# Patient Record
Sex: FEMALE | Race: WHITE | NOT HISPANIC OR LATINO | Employment: FULL TIME | URBAN - NONMETROPOLITAN AREA
[De-identification: names, ages, dates, MRNs, and addresses within clinical notes are randomized per-mention and may not be internally consistent; named-entity substitution may affect disease eponyms.]

---
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
## 2018-01-02 ENCOUNTER — APPOINTMENT (OUTPATIENT)
Dept: PREADMISSION TESTING | Facility: HOSPITAL | Age: 18
End: 2018-01-02

## 2018-01-02 VITALS
HEART RATE: 74 BPM | SYSTOLIC BLOOD PRESSURE: 115 MMHG | OXYGEN SATURATION: 100 % | DIASTOLIC BLOOD PRESSURE: 71 MMHG | BODY MASS INDEX: 19.7 KG/M2 | RESPIRATION RATE: 18 BRPM | HEIGHT: 67 IN | WEIGHT: 125.5 LBS

## 2018-01-02 LAB
DEPRECATED RDW RBC AUTO: 38.6 FL (ref 40–54)
ERYTHROCYTE [DISTWIDTH] IN BLOOD BY AUTOMATED COUNT: 12.8 % (ref 12–15)
HCT VFR BLD AUTO: 38 % (ref 37–47)
HGB BLD-MCNC: 12.5 G/DL (ref 12–16)
MCH RBC QN AUTO: 27.4 PG (ref 28–32)
MCHC RBC AUTO-ENTMCNC: 32.9 G/DL (ref 33–36)
MCV RBC AUTO: 83.3 FL (ref 82–98)
PLATELET # BLD AUTO: 250 10*3/MM3 (ref 130–400)
PMV BLD AUTO: 9.4 FL (ref 6–12)
RBC # BLD AUTO: 4.56 10*6/MM3 (ref 4.2–5.4)
WBC NRBC COR # BLD: 7.59 10*3/MM3 (ref 4.8–10.8)

## 2018-01-02 PROCEDURE — 85027 COMPLETE CBC AUTOMATED: CPT | Performed by: OTOLARYNGOLOGY

## 2018-01-02 PROCEDURE — 36415 COLL VENOUS BLD VENIPUNCTURE: CPT

## 2018-01-04 ENCOUNTER — ANESTHESIA EVENT (OUTPATIENT)
Dept: PERIOP | Facility: HOSPITAL | Age: 18
End: 2018-01-04

## 2018-01-04 NOTE — H&P (VIEW-ONLY)
YOB: 2000  Location: [facility]  Location Address: [address] [place] [address], [facility] [address], [address] [place] [address]  Location Phone: [phone]    Chief Complaint   Patient presents with   • Follow-up     ear problems       History of Present Illness  Lynette Faulkner is a 17 y.o. female.  Lynette Faulkner is here for follow up of ENT complaints. The patient has had problems with otalgia, ear fullness, ear pressure, recurrent ear infections, fluid on the ear and imbalance  The symptoms are not localized to a particular location. The patient has had moderate to severe symptoms. The symptoms have been present for the last year The symptoms are aggravated by  no identifiable factors. The symptoms are improved by medications temporarily. The patient has had several ear infections in the last year. The mother is unsure of the exact number but feels like it is 6 plus.       Past Medical History:   Diagnosis Date   • ADD (attention deficit disorder)    • Impacted cerumen    • Otalgia    • TMJ (dislocation of temporomandibular joint)        Past Surgical History:   Procedure Laterality Date   • CYST REMOVAL      left shoulder   • EXCISION LESION      left foot benign   • TONSILLECTOMY           Current Outpatient Prescriptions:   •  amphetamine-dextroamphetamine XR (ADDERALL XR) 20 MG 24 hr capsule, , Disp: , Rfl:   •  ciprofloxacin-dexamethasone (CIPRODEX) 0.3-0.1 % otic suspension, Administer 3 drops into both ears 3 (Three) Times a Day As Needed (otalgia)., Disp: 7.5 mL, Rfl: 0    Amoxicillin    Family History   Problem Relation Age of Onset   • No Known Problems Mother    • No Known Problems Father        Social History     Social History   • Marital status: Single     Spouse name: N/A   • Number of children: N/A   • Years of education: N/A     Occupational History   • Not on file.     Social History Main Topics   • Smoking status: Never Smoker   • Smokeless tobacco: Never Used   • Alcohol use No   • Drug use: Defer   •  Sexual activity: Not on file     Other Topics Concern   • Not on file     Social History Narrative       Review of Systems   Constitutional: Negative for activity change, appetite change, chills, diaphoresis, fatigue, fever and unexpected weight change.   HENT: Positive for ear pain. Negative for congestion, dental problem, drooling, ear discharge, facial swelling, hearing loss, mouth sores, nosebleeds, postnasal drip, rhinorrhea, sinus pressure, sneezing, sore throat, tinnitus, trouble swallowing and voice change.    Eyes: Negative.    Respiratory: Negative.    Cardiovascular: Negative.    Gastrointestinal: Negative.    Endocrine: Negative.    Skin: Negative.    Allergic/Immunologic: Negative for environmental allergies, food allergies and immunocompromised state.   Neurological: Negative.         Imbalance   Hematological: Negative.    Psychiatric/Behavioral: Negative.        Vitals:    12/11/17 1407   BP: 129/73   Pulse: 93   Temp: 97.9 °F (36.6 °C)       Objective     Physical Exam  CONSTITUTIONAL: well nourished, alert, oriented, in no acute distress     COMMUNICATION AND VOICE: able to communicate normally, normal voice quality    HEAD: normocephalic, no lesions, atraumatic, no tenderness, no masses     FACE: appearance normal, no lesions, no tenderness, no deformities, facial motion symmetric    SALIVARY GLANDS: parotid glands with no tenderness, no swelling, no masses, submandibular glands with normal size, nontender    EYES: ocular motility normal, eyelids normal, orbits normal, no proptosis, conjunctiva normal , pupils equal, round     EARS:  Hearing: response to conversational voice normal bilaterally   External Ears: auricles without lesions  Otoscopic: tympanic membrane appearance normal with mild retraction L>R, no lesions, no perforation, normal mobility, no fluid, type C tympanogram bilaterally    NOSE:  External Nose: structure normal, no tenderness on palpation, no nasal discharge, no lesions, no  evidence of trauma, nostrils patent   Intranasal Exam: nasal mucosa normal, vestibule within normal limits, inferior turbinate normal, nasal septum midline   Nasopharynx:     ORAL:  Lips: upper and lower lips without lesion   Teeth: dentition within normal limits for age   Gums: gingivae healthy   Oral Mucosa: oral mucosa normal, no mucosal lesions   Floor of Mouth: Warthin’s duct patent, mucosa normal  Tongue: lingual mucosa normal without lesions, normal tongue mobility   Palate: soft and hard palates with normal mucosa and structure  Oropharynx: oropharyngeal mucosa normal    NECK: neck appearance normal, no mass,  noted without erythema or tenderness    THYROID: no overt thyromegaly, no tenderness, nodules or mass present on palpation, position midline     LYMPH NODES: no lymphadenopathy    CHEST/RESPIRATORY: respiratory effort normal, normal breath sounds     CARDIOVASCULAR: rate and rhythm normal, extremities without cyanosis or edema      NEUROLOGIC/PSYCHIATRIC: oriented to time, place and person, mood normal, affect appropriate, CN II-XII intact grossly    Assessment/Plan   Problems Addressed this Visit        Nervous and Auditory    Dysfunction of both eustachian tubes - Primary    Relevant Orders    Case Request (Completed)    Recurrent acute suppurative otitis media without spontaneous rupture of tympanic membrane of both sides    Relevant Orders    Case Request (Completed)        BILATERAL MYRINGOTOMY WITH INSERTION OF EAR TUBES (Bilateral)  Orders Placed This Encounter   Procedures   • Follow Anesthesia Guidelines / Standing Orders     Standing Status:   Future   • Obtain informed consent     Order Specific Question:   Informed Consent Given For     Answer:   BILATERAL MYRINGOTOMY WITH INSERTION OF EAR TUBES   • Provide instructions to patient on NPO status     Return in about 6 months (around 6/11/2018) for Recheck ears/ear cleaning.       Patient Instructions   MYRINGOTOMY TUBE INSERTION: The risks  and benefits of myringotomy tube insertion were explained including but not limited to pain, aural fullness, bleeding, infection, risks of the anesthesia, persistent tympanic membrane perforation, chronic otorrhea, early and late extrusion, and the possibility for the need of reinsertion after extrusion. Alternatives were discussed. The patient/parents demonstrated understanding of these risks. Questions were asked appropriately answered.      Patient in the book for PE tubes on the 29th of December. The patient's mother will call when she decides.    Will plan a six month follow-up for ear cleaning.

## 2018-01-05 ENCOUNTER — HOSPITAL ENCOUNTER (OUTPATIENT)
Facility: HOSPITAL | Age: 18
Setting detail: HOSPITAL OUTPATIENT SURGERY
Discharge: HOME | End: 2018-01-05
Attending: OTOLARYNGOLOGY | Admitting: OTOLARYNGOLOGY

## 2018-01-05 ENCOUNTER — ANESTHESIA (OUTPATIENT)
Dept: PERIOP | Facility: HOSPITAL | Age: 18
End: 2018-01-05

## 2018-01-05 VITALS
OXYGEN SATURATION: 100 % | RESPIRATION RATE: 16 BRPM | HEART RATE: 73 BPM | SYSTOLIC BLOOD PRESSURE: 105 MMHG | DIASTOLIC BLOOD PRESSURE: 58 MMHG | TEMPERATURE: 98 F

## 2018-01-05 LAB — B-HCG UR QL: NEGATIVE

## 2018-01-05 PROCEDURE — 25010000002 ONDANSETRON PER 1 MG: Performed by: NURSE ANESTHETIST, CERTIFIED REGISTERED

## 2018-01-05 PROCEDURE — 25010000002 MIDAZOLAM PER 1 MG: Performed by: ANESTHESIOLOGY

## 2018-01-05 PROCEDURE — 25010000002 FENTANYL CITRATE (PF) 100 MCG/2ML SOLUTION: Performed by: NURSE ANESTHETIST, CERTIFIED REGISTERED

## 2018-01-05 PROCEDURE — 81025 URINE PREGNANCY TEST: CPT | Performed by: OTOLARYNGOLOGY

## 2018-01-05 PROCEDURE — 25010000002 PROPOFOL 10 MG/ML EMULSION: Performed by: NURSE ANESTHETIST, CERTIFIED REGISTERED

## 2018-01-05 PROCEDURE — 69436 CREATE EARDRUM OPENING: CPT | Performed by: OTOLARYNGOLOGY

## 2018-01-05 PROCEDURE — 25010000002 DEXAMETHASONE PER 1 MG: Performed by: ANESTHESIOLOGY

## 2018-01-05 DEVICE — TB EAR DURAVENT SIL ID 1.27MM IF 1.37MM BLU: Type: IMPLANTABLE DEVICE | Status: FUNCTIONAL

## 2018-01-05 RX ORDER — LABETALOL HYDROCHLORIDE 5 MG/ML
5 INJECTION, SOLUTION INTRAVENOUS
Status: DISCONTINUED | OUTPATIENT
Start: 2018-01-05 | End: 2018-01-05 | Stop reason: HOSPADM

## 2018-01-05 RX ORDER — FENTANYL CITRATE 50 UG/ML
INJECTION, SOLUTION INTRAMUSCULAR; INTRAVENOUS AS NEEDED
Status: DISCONTINUED | OUTPATIENT
Start: 2018-01-05 | End: 2018-01-05 | Stop reason: SURG

## 2018-01-05 RX ORDER — SODIUM CHLORIDE, SODIUM LACTATE, POTASSIUM CHLORIDE, CALCIUM CHLORIDE 600; 310; 30; 20 MG/100ML; MG/100ML; MG/100ML; MG/100ML
1000 INJECTION, SOLUTION INTRAVENOUS CONTINUOUS
Status: DISCONTINUED | OUTPATIENT
Start: 2018-01-05 | End: 2018-01-05 | Stop reason: HOSPADM

## 2018-01-05 RX ORDER — SODIUM CHLORIDE 0.9 % (FLUSH) 0.9 %
1-10 SYRINGE (ML) INJECTION AS NEEDED
Status: DISCONTINUED | OUTPATIENT
Start: 2018-01-05 | End: 2018-01-05 | Stop reason: HOSPADM

## 2018-01-05 RX ORDER — SODIUM CHLORIDE, SODIUM LACTATE, POTASSIUM CHLORIDE, CALCIUM CHLORIDE 600; 310; 30; 20 MG/100ML; MG/100ML; MG/100ML; MG/100ML
100 INJECTION, SOLUTION INTRAVENOUS CONTINUOUS
Status: DISCONTINUED | OUTPATIENT
Start: 2018-01-05 | End: 2018-01-05 | Stop reason: HOSPADM

## 2018-01-05 RX ORDER — METOCLOPRAMIDE HYDROCHLORIDE 5 MG/ML
5 INJECTION INTRAMUSCULAR; INTRAVENOUS
Status: DISCONTINUED | OUTPATIENT
Start: 2018-01-05 | End: 2018-01-05 | Stop reason: HOSPADM

## 2018-01-05 RX ORDER — MORPHINE SULFATE 2 MG/ML
2 INJECTION, SOLUTION INTRAMUSCULAR; INTRAVENOUS AS NEEDED
Status: DISCONTINUED | OUTPATIENT
Start: 2018-01-05 | End: 2018-01-05 | Stop reason: HOSPADM

## 2018-01-05 RX ORDER — SODIUM CHLORIDE 0.9 % (FLUSH) 0.9 %
3 SYRINGE (ML) INJECTION AS NEEDED
Status: DISCONTINUED | OUTPATIENT
Start: 2018-01-05 | End: 2018-01-05 | Stop reason: HOSPADM

## 2018-01-05 RX ORDER — CIPROFLOXACIN AND DEXAMETHASONE 3; 1 MG/ML; MG/ML
SUSPENSION/ DROPS AURICULAR (OTIC) AS NEEDED
Status: DISCONTINUED | OUTPATIENT
Start: 2018-01-05 | End: 2018-01-05 | Stop reason: HOSPADM

## 2018-01-05 RX ORDER — LIDOCAINE HYDROCHLORIDE 20 MG/ML
INJECTION, SOLUTION INFILTRATION; PERINEURAL AS NEEDED
Status: DISCONTINUED | OUTPATIENT
Start: 2018-01-05 | End: 2018-01-05 | Stop reason: SURG

## 2018-01-05 RX ORDER — MEPERIDINE HYDROCHLORIDE 25 MG/ML
12.5 INJECTION INTRAMUSCULAR; INTRAVENOUS; SUBCUTANEOUS
Status: DISCONTINUED | OUTPATIENT
Start: 2018-01-05 | End: 2018-01-05 | Stop reason: HOSPADM

## 2018-01-05 RX ORDER — MIDAZOLAM HYDROCHLORIDE 1 MG/ML
1 INJECTION INTRAMUSCULAR; INTRAVENOUS
Status: DISCONTINUED | OUTPATIENT
Start: 2018-01-05 | End: 2018-01-05 | Stop reason: HOSPADM

## 2018-01-05 RX ORDER — PROPOFOL 10 MG/ML
VIAL (ML) INTRAVENOUS AS NEEDED
Status: DISCONTINUED | OUTPATIENT
Start: 2018-01-05 | End: 2018-01-05 | Stop reason: SURG

## 2018-01-05 RX ORDER — DEXAMETHASONE SODIUM PHOSPHATE 4 MG/ML
4 INJECTION, SOLUTION INTRA-ARTICULAR; INTRALESIONAL; INTRAMUSCULAR; INTRAVENOUS; SOFT TISSUE ONCE AS NEEDED
Status: COMPLETED | OUTPATIENT
Start: 2018-01-05 | End: 2018-01-05

## 2018-01-05 RX ORDER — NALOXONE HCL 0.4 MG/ML
0.04 VIAL (ML) INJECTION AS NEEDED
Status: DISCONTINUED | OUTPATIENT
Start: 2018-01-05 | End: 2018-01-05 | Stop reason: HOSPADM

## 2018-01-05 RX ORDER — ONDANSETRON 2 MG/ML
INJECTION INTRAMUSCULAR; INTRAVENOUS AS NEEDED
Status: DISCONTINUED | OUTPATIENT
Start: 2018-01-05 | End: 2018-01-05 | Stop reason: SURG

## 2018-01-05 RX ORDER — MIDAZOLAM HYDROCHLORIDE 1 MG/ML
2 INJECTION INTRAMUSCULAR; INTRAVENOUS
Status: DISCONTINUED | OUTPATIENT
Start: 2018-01-05 | End: 2018-01-05 | Stop reason: HOSPADM

## 2018-01-05 RX ORDER — ONDANSETRON 2 MG/ML
4 INJECTION INTRAMUSCULAR; INTRAVENOUS AS NEEDED
Status: DISCONTINUED | OUTPATIENT
Start: 2018-01-05 | End: 2018-01-05 | Stop reason: HOSPADM

## 2018-01-05 RX ORDER — FLUMAZENIL 0.1 MG/ML
0.2 INJECTION INTRAVENOUS AS NEEDED
Status: DISCONTINUED | OUTPATIENT
Start: 2018-01-05 | End: 2018-01-05 | Stop reason: HOSPADM

## 2018-01-05 RX ORDER — HYDRALAZINE HYDROCHLORIDE 20 MG/ML
5 INJECTION INTRAMUSCULAR; INTRAVENOUS
Status: DISCONTINUED | OUTPATIENT
Start: 2018-01-05 | End: 2018-01-05 | Stop reason: HOSPADM

## 2018-01-05 RX ORDER — IPRATROPIUM BROMIDE AND ALBUTEROL SULFATE 2.5; .5 MG/3ML; MG/3ML
3 SOLUTION RESPIRATORY (INHALATION) ONCE AS NEEDED
Status: DISCONTINUED | OUTPATIENT
Start: 2018-01-05 | End: 2018-01-05 | Stop reason: HOSPADM

## 2018-01-05 RX ADMIN — DEXAMETHASONE SODIUM PHOSPHATE 4 MG: 4 INJECTION, SOLUTION INTRAMUSCULAR; INTRAVENOUS at 06:53

## 2018-01-05 RX ADMIN — SODIUM CHLORIDE, POTASSIUM CHLORIDE, SODIUM LACTATE AND CALCIUM CHLORIDE 1000 ML: 600; 310; 30; 20 INJECTION, SOLUTION INTRAVENOUS at 06:24

## 2018-01-05 RX ADMIN — ONDANSETRON HYDROCHLORIDE 4 MG: 2 SOLUTION INTRAMUSCULAR; INTRAVENOUS at 07:25

## 2018-01-05 RX ADMIN — LIDOCAINE HYDROCHLORIDE 50 MG: 20 INJECTION, SOLUTION INFILTRATION; PERINEURAL at 07:22

## 2018-01-05 RX ADMIN — FENTANYL CITRATE 50 MCG: 50 INJECTION, SOLUTION INTRAMUSCULAR; INTRAVENOUS at 07:24

## 2018-01-05 RX ADMIN — LIDOCAINE HYDROCHLORIDE 0.5 ML: 10 INJECTION, SOLUTION EPIDURAL; INFILTRATION; INTRACAUDAL; PERINEURAL at 06:22

## 2018-01-05 RX ADMIN — MIDAZOLAM HYDROCHLORIDE 2 MG: 1 INJECTION, SOLUTION INTRAMUSCULAR; INTRAVENOUS at 07:06

## 2018-01-05 RX ADMIN — FENTANYL CITRATE 50 MCG: 50 INJECTION, SOLUTION INTRAMUSCULAR; INTRAVENOUS at 07:22

## 2018-01-05 RX ADMIN — PROPOFOL 250 MG: 10 INJECTION, EMULSION INTRAVENOUS at 07:24

## 2018-01-05 NOTE — ANESTHESIA PREPROCEDURE EVALUATION
Anesthesia Evaluation     Patient summary reviewed   no history of anesthetic complications:  NPO Solid Status: > 8 hours  NPO Liquid Status: > 8 hours     Airway   Mallampati: II  TM distance: >3 FB  Neck ROM: full  Dental - normal exam     Pulmonary - normal exam    breath sounds clear to auscultation  (-) asthma, recent URI, sleep apnea, not a smoker  Cardiovascular - normal exam  Exercise tolerance: excellent (>7 METS)    Rhythm: regular  Rate: normal    (-) pacemaker, hypertension, past MI, angina, cardiac stents, CABG      Neuro/Psych  (-) seizures, TIA, CVA  GI/Hepatic/Renal/Endo    (-) GERD, liver disease, no renal disease, diabetes, hypothyroidism, hyperthyroidism    Musculoskeletal     Abdominal    Substance History      OB/GYN          Other                                                Anesthesia Plan    ASA 1     general     intravenous induction   Anesthetic plan and risks discussed with patient.

## 2018-01-05 NOTE — PLAN OF CARE
Problem: Perioperative Period (Adult)  Goal: Signs and Symptoms of Listed Potential Problems Will be Absent or Manageable (Perioperative Period)  Outcome: Ongoing (interventions implemented as appropriate)   01/05/18 0620   Perioperative Period   Problems Assessed (Perioperative Period) pain;hypothermia;hypoxia/hypoxemia;perioperative injury;situational response   Problems Present (Perioperative Period) pain;situational response

## 2018-01-05 NOTE — PLAN OF CARE
Problem: Patient Care Overview (Adult)  Goal: Plan of Care Review  Outcome: Outcome(s) achieved Date Met: 01/05/18 01/05/18 0938   Coping/Psychosocial Response Interventions   Plan Of Care Reviewed With patient;mother;father   Patient Care Overview   Progress improving   Outcome Evaluation   Outcome Summary/Follow up Plan pt meets d/c criteria       Problem: Perioperative Period (Adult)  Goal: Signs and Symptoms of Listed Potential Problems Will be Absent or Manageable (Perioperative Period)  Outcome: Outcome(s) achieved Date Met: 01/05/18

## 2018-01-05 NOTE — PLAN OF CARE
Problem: Patient Care Overview (Adult)  Goal: Plan of Care Review  Outcome: Ongoing (interventions implemented as appropriate)   01/05/18 0717   Coping/Psychosocial Response Interventions   Plan Of Care Reviewed With patient   Patient Care Overview   Progress improving   Outcome Evaluation   Outcome Summary/Follow up Plan less anxious

## 2018-01-05 NOTE — OP NOTE
[facility]  OPERATIVE REPORT    Lynette Faulkner  1/5/2018    Pre-op Diagnosis:   Recurrent acute suppurative otitis media without spontaneous rupture of tympanic membrane of both sides [H66.006]  Dysfunction of both eustachian tubes [H69.83]    Post-op Diagnosis:     Recurrent acute suppurative otitis media without spontaneous rupture of tympanic membrane of both sides [H66.006]  Dysfunction of both eustachian tubes [H69.83]    Procedure/CPT® Codes:  BILATERAL MYRINGOTOMY WITH INSERTION OF EAR TUBES    Surgeon(s):  Gama Nuno MD    Anesthesia:   General Mask Anesthesia    Estimated Blood Loss:   None    Specimens:                None      Drains:   None    Findings:  As below    Complications:  None    Procedure Description:  The patient was taken back to the operating room, placed in the supine position and under General Mask Anesthesia the patient was prepped and draped in the usual fashion.      A speculum was introduced in the left external auditory canal and visualization undertaken with the Leica operating microscope.  A moderate amount of cerumen was removed with a cerumen loop and an anterior inferior myringotomy incision was made with the myringotomy knife.  A large serous effusion was suctioned from the middle ear space.  The middle ear mucosa appeared moderately edematous.  A Duravent tube was placed into the myringotomy site without difficulty and Ciprodex Drops added to the external auditory canal.  A cotton ball was added to the meatus.  Attention was turned to the opposite ear where a similar procedure was accomplished with similar findings.    The procedure was subsequently terminated.  The patient tolerated the procedure well without complictions.   The patient subsequently was transported to the Post Anesthesia Care Unit in stable condition.       Gama Nuno MD     Date: 1/5/2018  Time: 7:18 AM

## 2018-01-05 NOTE — DISCHARGE INSTRUCTIONS

## 2018-04-01 NOTE — DISCHARGE INSTRUCTIONS
DAY OF SURGERY INSTRUCTIONS        YOUR SURGEON: Dr. Nuno    PROCEDURE: ear tubes    DATE OF SURGERY: January 5, 2018    ARRIVAL TIME: AS DIRECTED BY OFFICE    DAY OF SURGERY TAKE ONLY THESE MEDICATIONS: none            BEFORE YOU COME TO THE HOSPITAL  (Pre-op instructions)  • Do not eat, drink, smoke or chew gum after midnight the night before surgery.  This also includes no mints.  • Morning of surgery take only the medicines you have been instructed with a sip of water unless otherwise instructed  by your physician.  • Do not shave, wear makeup or dark nail polish.  • Remove all jewelry including rings.  • Leave anything you consider valuable at home.  • Leave your suitcase in the car until after your surgery.  • Bring the following with you if applicable:  o Picture ID and insurance, Medicare or Medicaid cards  o Co-pay/deductible required by insurance (cash, check, credit card)  o Copy of advance directive, living will or power-of- documents if not brought to PAT  o CPAP or BIPAP mask and tubing  o Relaxation aids (MP3 player, book, magazine)  • On the day of surgery check in at registration located at the main entrance of the hospital.       Outpatient Surgery Guidelines, Adult  Outpatient procedures are those for which the person having the procedure is allowed to go home the same day as the procedure. Various procedures are done on an outpatient basis. You should follow some general guidelines if you will be having an outpatient procedure.  LET YOUR HEALTH CARE PROVIDER KNOW ABOUT:  · Any allergies you have.  · All medicines you are taking, including vitamins, herbs, eye drops, creams, and over-the-counter medicines.  · Previous problems you or members of your family have had with the use of anesthetics.  · Any blood disorders you have.  · Previous surgeries you have had.  · Medical conditions you have.  RISKS AND COMPLICATIONS  Your health care provider will discuss possible risks and complications  with you before surgery. Common risks and complications include:    · Problems due to the use of anesthetics.  · Blood loss and replacement (does not apply to minor surgical procedures).  · Temporary increase in pain due to surgery.  · Uncorrected pain or problems that the surgery was meant to correct.  · Infection.  · New damage.  BEFORE THE PROCEDURE  · Ask your health care provider about changing or stopping your regular medicines. You may need to stop taking certain medicines in the days or weeks before the procedure.  · Stop smoking at least 2 weeks before surgery. This lowers your risk for complications during and after surgery. Ask your health care provider for help with this if needed.  · Eat your usual meals and a light supper the day before surgery. Continue fluid intake. Do not drink alcohol.  · Do not eat or drink after midnight the night before your surgery.   · Arrange for someone to take you home and to stay with you for 24 hours after the procedure. Medicine given for your procedure may affect your ability to drive or to care for yourself.  · Call your health care provider's office if you develop an illness or problem that may prevent you from safely having your procedure.  AFTER THE PROCEDURE  After surgery, you will be taken to a recovery area, where your progress will be monitored. If there are no complications, you will be allowed to go home when you are awake, stable, and taking fluids well. You may have numbness around the surgical site. Healing will take some time. You will have tenderness at the surgical site and may have some swelling and bruising. You may also have some nausea.  HOME CARE INSTRUCTIONS  · Do not drive for 24 hours, or as directed by your health care provider. Do not drive while taking prescription pain medicines.  · Do not drink alcohol for 24 hours.  · Do not make important decisions or sign legal documents for 24 hours.  · You may resume a normal diet and activities as  directed.  · Do not lift anything heavier than 10 pounds (4.5 kg) or play contact sports until your health care provider says it is okay.  · Change your bandages (dressings) as directed.  · Only take over-the-counter or prescription medicines as directed by your health care provider.  · Follow up with your health care provider as directed.  SEEK MEDICAL CARE IF:  · You have increased bleeding (more than a small spot) from the surgical site.  · You have redness, swelling, or increasing pain in the wound.  · You see pus coming from the wound.  · You have a fever.  · You notice a bad smell coming from the wound or dressing.  · You feel lightheaded or faint.  · You develop a rash.  · You have trouble breathing.  · You develop allergies.  MAKE SURE YOU:  · Understand these instructions.  · Will watch your condition.  · Will get help right away if you are not doing well or get worse.     This information is not intended to replace advice given to you by your health care provider. Make sure you discuss any questions you have with your health care provider.     Document Released: 09/12/2002 Document Revised: 05/03/2016 Document Reviewed: 05/22/2014  [employer] Interactive Patient Education ©2016 [employer]       Fall Prevention in Hospitals, Adult  As a hospital patient, your condition and the treatments you receive can increase your risk for falls. Some additional risk factors for falls in a hospital include:  · Being in an unfamiliar environment.  · Being on bed rest.  · Your surgery.  · Taking certain medicines.  · Your tubing requirements, such as intravenous (IV) therapy or catheters.  It is important that you learn how to decrease fall risks while at the hospital. Below are important tips that can help prevent falls.  SAFETY TIPS FOR PREVENTING FALLS  Talk about your risk of falling.  · Ask your health care provider why you are at risk for falling. Is it your medicine, illness, tubing placement, or something  else?  · Make a plan with your health care provider to keep you safe from falls.  · Ask your health care provider or pharmacist about side effects of your medicines. Some medicines can make you dizzy or affect your coordination.  Ask for help.  · Ask for help before getting out of bed. You may need to press your call button.  · Ask for assistance in getting safely to the toilet.  · Ask for a walker or cane to be put at your bedside. Ask that most of the side rails on your bed be placed up before your health care provider leaves the room.  · Ask family or friends to sit with you.  · Ask for things that are out of your reach, such as your glasses, hearing aids, telephone, bedside table, or call button.  Follow these tips to avoid falling:  · Stay lying or seated, rather than standing, while waiting for help.  · Wear rubber-soled slippers or shoes whenever you walk in the hospital.  · Avoid quick, sudden movements.  ¨ Change positions slowly.  ¨ Sit on the side of your bed before standing.  ¨ Stand up slowly and wait before you start to walk.  · Let your health care provider know if there is a spill on the floor.  · Pay careful attention to the medical equipment, electrical cords, and tubes around you.  · When you need help, use your call button by your bed or in the bathroom. Wait for one of your health care providers to help you.  · If you feel dizzy or unsure of your footing, return to bed and wait for assistance.  · Avoid being distracted by the TV, telephone, or another person in your room.  · Do not lean or support yourself on rolling objects, such as IV poles or bedside tables.     This information is not intended to replace advice given to you by your health care provider. Make sure you discuss any questions you have with your health care provider.     Document Released: 12/15/2001 Document Revised: 01/08/2016 Document Reviewed: 08/25/2013  [employer] Interactive Patient Education ©2016 [employer]       Surgical Site Infections FAQs  What is a Surgical Site Infection (SSI)?  A surgical site infection is an infection that occurs after surgery in the part of the body where the surgery took place. Most patients who have surgery do not develop an infection. However, infections develop in about 1 to 3 out of every 100 patients who have surgery.  Some of the common symptoms of a surgical site infection are:  · Redness and pain around the area where you had surgery  · Drainage of cloudy fluid from your surgical wound  · Fever  Can SSIs be treated?  Yes. Most surgical site infections can be treated with antibiotics. The antibiotic given to you depends on the bacteria (germs) causing the infection. Sometimes patients with SSIs also need another surgery to treat the infection.  What are some of the things that hospitals are doing to prevent SSIs?  To prevent SSIs, doctors, nurses, and other healthcare providers:  · Clean their hands and arms up to their elbows with an antiseptic agent just before the surgery.  · Clean their hands with soap and water or an alcohol-based hand rub before and after caring for each patient.  · May remove some of your hair immediately before your surgery using electric clippers if the hair is in the same area where the procedure will occur. They should not shave you with a razor.  · Wear special hair covers, masks, gowns, and gloves during surgery to keep the surgery area clean.  · Give you antibiotics before your surgery starts. In most cases, you should get antibiotics within 60 minutes before the surgery starts and the antibiotics should be stopped within 24 hours after surgery.  · Clean the skin at the site of your surgery with a special soap that kills germs.  What can I do to help prevent SSIs?  Before your surgery:  · Tell your doctor about other medical problems you may have. Health problems such as allergies, diabetes, and obesity could affect your surgery and your  treatment.  · Quit smoking. Patients who smoke get more infections. Talk to your doctor about how you can quit before your surgery.  · Do not shave near where you will have surgery. Shaving with a razor can irritate your skin and make it easier to develop an infection.  At the time of your surgery:  · Speak up if someone tries to shave you with a razor before surgery. Ask why you need to be shaved and talk with your surgeon if you have any concerns.  · Ask if you will get antibiotics before surgery.  After your surgery:  · Make sure that your healthcare providers clean their hands before examining you, either with soap and water or an alcohol-based hand rub.  · If you do not see your providers clean their hands, please ask them to do so.  · Family and friends who visit you should not touch the surgical wound or dressings.  · Family and friends should clean their hands with soap and water or an alcohol-based hand rub before and after visiting you. If you do not see them clean their hands, ask them to clean their hands.  What do I need to do when I go home from the hospital?  · Before you go home, your doctor or nurse should explain everything you need to know about taking care of your wound. Make sure you understand how to care for your wound before you leave the hospital.  · Always clean your hands before and after caring for your wound.  · Before you go home, make sure you know who to contact if you have questions or problems after you get home.  · If you have any symptoms of an infection, such as redness and pain at the surgery site, drainage, or fever, call your doctor immediately.  If you have additional questions, please ask your doctor or nurse.  Developed and co-sponsored by The Society for Healthcare Epidemiology of [place] (SHEA); Infectious Diseases Society of [place] (IDSA); American Hospital Association; Association for Professionals in Infection Control and Epidemiology (APIC); Centers for Disease  Control and Prevention (CDC); and The Joint Commission.     This information is not intended to replace advice given to you by your health care provider. Make sure you discuss any questions you have with your health care provider.     Document Released: 12/23/2014 Document Revised: 01/08/2016 Document Reviewed: 03/02/2016  [employer] Interactive Patient Education ©2016 [employer]     PATIENT/FAMILY/RESPONSIBLE PARTY VERBALIZES UNDERSTANDING OF ABOVE EDUCATION.  COPY OF PAIN SCALE GIVEN AND REVIEWED WITH VERBALIZED UNDERSTANDING.   I have reviewed and confirmed nurses' notes...

## 2018-04-18 ENCOUNTER — PROCEDURE VISIT (OUTPATIENT)
Dept: OTOLARYNGOLOGY | Facility: CLINIC | Age: 18
End: 2018-04-18

## 2018-04-18 ENCOUNTER — OFFICE VISIT (OUTPATIENT)
Dept: OTOLARYNGOLOGY | Facility: CLINIC | Age: 18
End: 2018-04-18

## 2018-04-18 VITALS
TEMPERATURE: 98.2 F | DIASTOLIC BLOOD PRESSURE: 72 MMHG | HEART RATE: 87 BPM | SYSTOLIC BLOOD PRESSURE: 110 MMHG | BODY MASS INDEX: 20.73 KG/M2 | WEIGHT: 129 LBS | HEIGHT: 66 IN

## 2018-04-18 DIAGNOSIS — H65.33 CHRONIC MUCOID OTITIS MEDIA OF BOTH EARS: ICD-10-CM

## 2018-04-18 DIAGNOSIS — H69.83 DYSFUNCTION OF BOTH EUSTACHIAN TUBES: Primary | ICD-10-CM

## 2018-04-18 PROCEDURE — 92567 TYMPANOMETRY: CPT | Performed by: NURSE PRACTITIONER

## 2018-04-18 PROCEDURE — 99213 OFFICE O/P EST LOW 20 MIN: CPT | Performed by: NURSE PRACTITIONER

## 2018-04-18 PROCEDURE — 92552 PURE TONE AUDIOMETRY AIR: CPT | Performed by: NURSE PRACTITIONER

## 2018-04-18 NOTE — PROGRESS NOTES
YOB: 2000  Location: [facility]  Location Address: [address] [place] [address], [facility], [address] [place] [address]  Location Phone: [phone]    Chief Complaint   Patient presents with   • Ear Problem       History of Present Illness  Lynette Faulkner is a 17 y.o. female.  Lynette Faulkner is here for follow up of ENT complaints s/p BMT. The patient has had problems with otalgia  The symptoms are not localized to a particular location. The patient has had mild symptoms. The symptoms have been present for the last several weeks The symptoms are aggravated by  no identifiable factors. The symptoms are improved by no identifiable factors.       Progress Notes  Encounter Date: 2018 2:30 PM  Stephani Goodrich   Audiology      []Manual[]Template  [x]Copied  [x]Stephani Goodrich    []Olegver for attribution information               Past Medical History:   Diagnosis Date   • ADD (attention deficit disorder)    • Chronic otitis media    • Eustachian tube dysfunction    • H/O dizziness     from ear problems   • History of ear infections    • Impacted cerumen    • Otalgia    • TMJ (dislocation of temporomandibular joint)        Past Surgical History:   Procedure Laterality Date   • CYST REMOVAL      left shoulder   • EXCISION LESION      left foot benign   • MYRINGOTOMY W/ TUBES Bilateral 2018    Procedure: BILATERAL MYRINGOTOMY WITH INSERTION OF EAR TUBES;  Surgeon: Gama Nuno MD;  Location: [facility];  Service:    • TONSILLECTOMY         Outpatient Prescriptions Marked as Taking for the 18 encounter (Office Visit) with KARIN Goodwin   Medication Sig Dispense Refill   • amphetamine-dextroamphetamine XR (ADDERALL XR) 20 MG 24 hr capsule Take 20 mg by mouth Every Morning           Amoxicillin    Family History   Problem Relation Age of Onset   • No Known Problems Mother    • No Known Problems Father        Social History     Social History   • Marital status: Single     Spouse name: N/A   • Number of  children: N/A   • Years of education: N/A     Occupational History   • Not on file.     Social History Main Topics   • Smoking status: Never Smoker   • Smokeless tobacco: Never Used   • Alcohol use No   • Drug use: Unknown   • Sexual activity: Defer     Other Topics Concern   • Not on file     Social History Narrative   • No narrative on file       Review of Systems   Constitutional: Negative.    HENT:        SEE HPI   Eyes: Negative.    Respiratory: Negative.    Cardiovascular: Negative.    Gastrointestinal: Negative.    Endocrine: Negative.    Genitourinary: Negative.    Musculoskeletal: Negative.    Skin: Negative.    Allergic/Immunologic: Negative.    Neurological: Negative.    Hematological: Negative.    Psychiatric/Behavioral: Negative.        Vitals:    04/18/18 1508   BP: 110/72   Pulse: 87   Temp: 98.2 °F (36.8 °C)       Body mass index is 20.82 kg/m².    Objective     Physical Exam  CONSTITUTIONAL: well nourished, alert, oriented, in no acute distress     COMMUNICATION AND VOICE: able to communicate normally, normal voice quality    HEAD: normocephalic, no lesions, atraumatic, no tenderness, no masses     FACE: appearance normal, no lesions, no tenderness, no deformities, facial motion symmetric    SALIVARY GLANDS: parotid glands with no tenderness, no swelling, no masses, submandibular glands with normal size, nontender    EYES: ocular motility normal, eyelids normal, orbits normal, no proptosis, conjunctiva normal , pupils equal, round     EARS:  Hearing: response to conversational voice normal bilaterally   External Ears: auricles without lesions  Otoscopic: bilateral TMs with intact and patent PET      NOSE:  External Nose: structure normal, no tenderness on palpation, no nasal discharge, no lesions, no evidence of trauma, nostrils patent   Intranasal Exam: nasal mucosa normal, vestibule within normal limits, inferior turbinate normal, nasal septum midline       ORAL:  Lips: upper and lower lips  without lesion   Teeth: dentition within normal limits for age   Gums: gingivae healthy   Oral Mucosa: oral mucosa normal, no mucosal lesions   Floor of Mouth: Warthin’s duct patent, mucosa normal  Tongue: lingual mucosa normal without lesions, normal tongue mobility   Palate: soft and hard palates with normal mucosa and structure  Oropharynx: oropharyngeal mucosa normal      NECK: neck appearance normal, no mass,  noted without erythema or tenderness    THYROID: no overt thyromegaly, no tenderness, nodules or mass present on palpation, position midline     LYMPH NODES: no lymphadenopathy    CHEST/RESPIRATORY: respiratory effort normal,     CARDIOVASCULAR:  extremities without cyanosis or edema      NEUROLOGIC/PSYCHIATRIC: oriented to time, place and person, mood normal, affect appropriate, CN II-XII intact grossly    Assessment/Plan   Lynette was seen today for ear problem.    Diagnoses and all orders for this visit:    Dysfunction of both eustachian tubes    Chronic mucoid otitis media of both ears      * Surgery not found *  No orders of the defined types were placed in this encounter.    Return in about 6 months (around 10/18/2018).       Patient Instructions   Dry ear precautions    Call for problems or worsening symptoms

## 2018-08-20 ENCOUNTER — OFFICE VISIT (OUTPATIENT)
Dept: OTOLARYNGOLOGY | Facility: CLINIC | Age: 18
End: 2018-08-20

## 2018-08-20 VITALS — TEMPERATURE: 97.9 F | HEIGHT: 66 IN | BODY MASS INDEX: 20.73 KG/M2 | WEIGHT: 129 LBS

## 2018-08-20 DIAGNOSIS — H69.83 DYSFUNCTION OF BOTH EUSTACHIAN TUBES: ICD-10-CM

## 2018-08-20 DIAGNOSIS — H65.33 CHRONIC MUCOID OTITIS MEDIA OF BOTH EARS: ICD-10-CM

## 2018-08-20 DIAGNOSIS — H61.23 BILATERAL IMPACTED CERUMEN: Primary | ICD-10-CM

## 2018-08-20 PROCEDURE — 99213 OFFICE O/P EST LOW 20 MIN: CPT | Performed by: NURSE PRACTITIONER

## 2018-08-20 RX ORDER — NEOMYCIN SULFATE, POLYMYXIN B SULFATE, HYDROCORTISONE 3.5; 10000; 1 MG/ML; [USP'U]/ML; MG/ML
3 SOLUTION/ DROPS AURICULAR (OTIC) 2 TIMES DAILY
Qty: 10 ML | Refills: 0 | Status: SHIPPED | OUTPATIENT
Start: 2018-08-20 | End: 2019-09-17

## 2018-12-27 ENCOUNTER — OFFICE VISIT (OUTPATIENT)
Dept: OTOLARYNGOLOGY | Facility: CLINIC | Age: 18
End: 2018-12-27

## 2018-12-27 VITALS — BODY MASS INDEX: 21.4 KG/M2 | HEIGHT: 66 IN | TEMPERATURE: 98.7 F | WEIGHT: 133.13 LBS

## 2018-12-27 DIAGNOSIS — H65.33 CHRONIC MUCOID OTITIS MEDIA OF BOTH EARS: ICD-10-CM

## 2018-12-27 DIAGNOSIS — H69.83 DYSFUNCTION OF BOTH EUSTACHIAN TUBES: Primary | ICD-10-CM

## 2018-12-27 PROCEDURE — 99213 OFFICE O/P EST LOW 20 MIN: CPT | Performed by: NURSE PRACTITIONER

## 2018-12-27 RX ORDER — ADAPALENE AND BENZOYL PEROXIDE 3; 25 MG/G; MG/G
GEL TOPICAL
COMMUNITY
Start: 2018-11-26 | End: 2022-01-12

## 2018-12-27 NOTE — PROGRESS NOTES
YOB: 2000  Location: [facility]  Location Address: [address] [place] [address], [facility] [address], [address] [place] [address]  Location Phone: [phone]    Chief Complaint   Patient presents with   • Cerumen Impaction       History of Present Illness  Lynette Faulkner is a 18 y.o. female.  Lynette Faulkner is here for follow up of ENT complaints. The patient has had problems with cerumen accumulation and a history of ear tube placement  The symptoms are not localized to a particular location. The patient has had insignificant symptoms. The symptoms have been present for the last several months The symptoms are aggravated by  no identifiable factors. The symptoms are improved by no identifiable factors.       Past Medical History:   Diagnosis Date   • ADD (attention deficit disorder)    • Chronic otitis media    • Eustachian tube dysfunction    • H/O dizziness     from ear problems   • History of ear infections    • Impacted cerumen    • Otalgia    • TMJ (dislocation of temporomandibular joint)        Past Surgical History:   Procedure Laterality Date   • CYST REMOVAL      left shoulder   • EXCISION LESION      left foot benign   • MYRINGOTOMY W/ TUBES Bilateral 2018    Procedure: BILATERAL MYRINGOTOMY WITH INSERTION OF EAR TUBES;  Surgeon: Gama Nuno MD;  Location: [facility];  Service:    • TONSILLECTOMY         Outpatient Medications Marked as Taking for the 18 encounter (Office Visit) with Anais Dukes APRN   Medication Sig Dispense Refill   • amphetamine-dextroamphetamine XR (ADDERALL XR) 20 MG 24 hr capsule Take 20 mg by mouth Every Morning       • EPIDUO FORTE 0.3-2.5 % gel      • neomycin-polymyxin-hydrocortisone (CORTISPORIN) 1 % solution otic solution Administer 3 drops into both ears 2 (Two) Times a Day. 10 mL 0       Amoxicillin    Family History   Problem Relation Age of Onset   • No Known Problems Mother    • No Known Problems Father        Social History     Socioeconomic History   • Marital  status: Single     Spouse name: Not on file   • Number of children: Not on file   • Years of education: Not on file   • Highest education level: Not on file   Social Needs   • Financial resource strain: Not on file   • Food insecurity - worry: Not on file   • Food insecurity - inability: Not on file   • Transportation needs - medical: Not on file   • Transportation needs - non-medical: Not on file   Occupational History   • Not on file   Tobacco Use   • Smoking status: Never Smoker   • Smokeless tobacco: Never Used   Substance and Sexual Activity   • Alcohol use: No   • Drug use: Defer   • Sexual activity: Defer   Other Topics Concern   • Not on file   Social History Narrative   • Not on file       Review of Systems    Vitals:    12/27/18 1516   Temp: 98.7 °F (37.1 °C)       Body mass index is 21.49 kg/m².    Objective     Physical Exam  CONSTITUTIONAL: well nourished, alert, oriented, in no acute distress     COMMUNICATION AND VOICE: able to communicate normally, normal voice quality    HEAD: normocephalic, no lesions, atraumatic, no tenderness, no masses     FACE: appearance normal, no lesions, no tenderness, no deformities, facial motion symmetric    SALIVARY GLANDS: parotid glands with no tenderness, no swelling, no masses, submandibular glands with normal size, nontender    EYES: ocular motility normal, eyelids normal, orbits normal, no proptosis, conjunctiva normal , pupils equal, round     EARS:  Hearing: response to conversational voice normal bilaterally   External Ears: auricles without lesions  Otoscopic: bilateral TMs with intact and patent PET, bilateral EACs with mild cerumen removed with suction    NOSE:  External Nose: structure normal, no tenderness on palpation, no nasal discharge, no lesions, no evidence of trauma, nostrils patent   Intranasal Exam: nasal mucosa normal, vestibule within normal limits, inferior turbinate normal, nasal septum midline     ORAL:  Lips: upper and lower lips without  lesion   Teeth: dentition within normal limits for age   Gums: gingivae healthy   Oral Mucosa: oral mucosa normal, no mucosal lesions   Floor of Mouth: Warthin’s duct patent, mucosa normal  Tongue: lingual mucosa normal without lesions, normal tongue mobility   Palate: soft and hard palates with normal mucosa and structure  Oropharynx: oropharyngeal mucosa normal    NECK: neck appearance normal, no mass,  noted without erythema or tenderness    LYMPH NODES: no lymphadenopathy    CHEST/RESPIRATORY: respiratory effort normal  CARDIOVASCULAR:  extremities without cyanosis or edema      NEUROLOGIC/PSYCHIATRIC: oriented to time, place and person, mood normal, affect appropriate, CN II-XII intact grossly    Assessment/Plan   Lynette was seen today for cerumen impaction.    Diagnoses and all orders for this visit:    Dysfunction of both eustachian tubes    Chronic mucoid otitis media of both ears      * Surgery not found *  No orders of the defined types were placed in this encounter.    Return in about 6 months (around 6/27/2019).       Patient Instructions   Dry ear precautions    Call for problems or worsening symptoms

## 2019-01-09 ENCOUNTER — TELEPHONE (OUTPATIENT)
Dept: NEUROSURGERY | Age: 19
End: 2019-01-09

## 2019-01-15 ENCOUNTER — OFFICE VISIT (OUTPATIENT)
Dept: NEUROSURGERY | Age: 19
End: 2019-01-15
Payer: COMMERCIAL

## 2019-01-15 VITALS
SYSTOLIC BLOOD PRESSURE: 119 MMHG | BODY MASS INDEX: 21.15 KG/M2 | DIASTOLIC BLOOD PRESSURE: 64 MMHG | WEIGHT: 131.6 LBS | HEIGHT: 66 IN | HEART RATE: 94 BPM

## 2019-01-15 DIAGNOSIS — R20.0 NUMBNESS AND TINGLING: Primary | ICD-10-CM

## 2019-01-15 DIAGNOSIS — R20.2 NUMBNESS AND TINGLING: ICD-10-CM

## 2019-01-15 DIAGNOSIS — R20.0 NUMBNESS AND TINGLING: ICD-10-CM

## 2019-01-15 DIAGNOSIS — R20.2 NUMBNESS AND TINGLING: Primary | ICD-10-CM

## 2019-01-15 DIAGNOSIS — F41.9 ANXIETY: ICD-10-CM

## 2019-01-15 LAB
ALBUMIN SERPL-MCNC: 4.5 G/DL (ref 3.5–5.2)
ALP BLD-CCNC: 73 U/L (ref 35–104)
ALT SERPL-CCNC: 15 U/L (ref 5–33)
ANION GAP SERPL CALCULATED.3IONS-SCNC: 14 MMOL/L (ref 7–19)
AST SERPL-CCNC: 16 U/L (ref 5–32)
BASOPHILS ABSOLUTE: 0 K/UL (ref 0–0.2)
BASOPHILS RELATIVE PERCENT: 0.7 % (ref 0–1)
BILIRUB SERPL-MCNC: 0.5 MG/DL (ref 0.2–1.2)
BUN BLDV-MCNC: 9 MG/DL (ref 6–20)
C-REACTIVE PROTEIN: 0.05 MG/DL (ref 0–0.5)
CALCIUM SERPL-MCNC: 9 MG/DL (ref 8.6–10)
CHLORIDE BLD-SCNC: 104 MMOL/L (ref 98–111)
CO2: 23 MMOL/L (ref 22–29)
CREAT SERPL-MCNC: 0.6 MG/DL (ref 0.5–0.9)
EOSINOPHILS ABSOLUTE: 0.2 K/UL (ref 0–0.6)
EOSINOPHILS RELATIVE PERCENT: 3.4 % (ref 0–5)
GFR NON-AFRICAN AMERICAN: >60
GLUCOSE BLD-MCNC: 83 MG/DL (ref 74–109)
HCT VFR BLD CALC: 37.4 % (ref 37–47)
HEMOGLOBIN: 12 G/DL (ref 12–16)
LYMPHOCYTES ABSOLUTE: 2.2 K/UL (ref 1.1–4.5)
LYMPHOCYTES RELATIVE PERCENT: 39 % (ref 20–40)
MCH RBC QN AUTO: 26.4 PG (ref 27–31)
MCHC RBC AUTO-ENTMCNC: 32.1 G/DL (ref 33–37)
MCV RBC AUTO: 82.2 FL (ref 81–99)
MONOCYTES ABSOLUTE: 0.6 K/UL (ref 0–0.9)
MONOCYTES RELATIVE PERCENT: 10.4 % (ref 0–10)
NEUTROPHILS ABSOLUTE: 2.6 K/UL (ref 1.5–7.5)
NEUTROPHILS RELATIVE PERCENT: 46.3 % (ref 50–65)
PDW BLD-RTO: 13.1 % (ref 11.5–14.5)
PLATELET # BLD: 188 K/UL (ref 130–400)
PMV BLD AUTO: 9.2 FL (ref 9.4–12.3)
POTASSIUM SERPL-SCNC: 3.9 MMOL/L (ref 3.5–5)
RBC # BLD: 4.55 M/UL (ref 4.2–5.4)
SEDIMENTATION RATE, ERYTHROCYTE: 2 MM/HR (ref 0–20)
SODIUM BLD-SCNC: 141 MMOL/L (ref 136–145)
T4 FREE: 1.3 NG/DL (ref 0.9–1.7)
TOTAL PROTEIN: 7.1 G/DL (ref 6.6–8.7)
TSH SERPL DL<=0.05 MIU/L-ACNC: 1.08 UIU/ML (ref 0.27–4.2)
VITAMIN B-12: 860 PG/ML (ref 211–946)
WBC # BLD: 5.7 K/UL (ref 4.8–10.8)

## 2019-01-15 PROCEDURE — 99204 OFFICE O/P NEW MOD 45 MIN: CPT | Performed by: NURSE PRACTITIONER

## 2019-01-15 RX ORDER — DEXTROAMPHETAMINE SACCHARATE, AMPHETAMINE ASPARTATE MONOHYDRATE, DEXTROAMPHETAMINE SULFATE AND AMPHETAMINE SULFATE 5; 5; 5; 5 MG/1; MG/1; MG/1; MG/1
20 CAPSULE, EXTENDED RELEASE ORAL EVERY MORNING
COMMUNITY

## 2019-01-15 RX ORDER — CEFDINIR 300 MG/1
300 CAPSULE ORAL 2 TIMES DAILY
COMMUNITY

## 2019-01-15 RX ORDER — DIAZEPAM 5 MG/1
TABLET ORAL
Qty: 2 TABLET | Refills: 0 | Status: SHIPPED | OUTPATIENT
Start: 2019-01-15 | End: 2019-02-15

## 2019-01-18 LAB
ALBUMIN SERPL-MCNC: 4.34 G/DL (ref 3.75–5.01)
ALPHA-1-GLOBULIN: 0.4 G/DL (ref 0.19–0.46)
ALPHA-2-GLOBULIN: 0.92 G/DL (ref 0.48–1.05)
BETA GLOBULIN: 0.88 G/DL (ref 0.48–1.1)
GAMMA GLOBULIN: 0.96 G/DL (ref 0.62–1.51)
PROTEIN ELECTROPHORESIS, SERUM: NORMAL
SPE/IFE INTERPRETATION: NORMAL
TOTAL PROTEIN: 7.5 G/DL (ref 6–8.3)

## 2019-01-25 ENCOUNTER — TELEPHONE (OUTPATIENT)
Dept: NEUROLOGY | Age: 19
End: 2019-01-25

## 2019-02-14 ENCOUNTER — HOSPITAL ENCOUNTER (OUTPATIENT)
Dept: MRI IMAGING | Age: 19
Discharge: HOME | End: 2019-02-14
Payer: COMMERCIAL

## 2019-02-14 ENCOUNTER — HOSPITAL ENCOUNTER (OUTPATIENT)
Dept: NEUROLOGY | Age: 19
Discharge: HOME | End: 2019-02-14
Payer: COMMERCIAL

## 2019-02-14 DIAGNOSIS — R20.0 NUMBNESS AND TINGLING: ICD-10-CM

## 2019-02-14 DIAGNOSIS — R20.2 NUMBNESS AND TINGLING: ICD-10-CM

## 2019-02-14 PROCEDURE — 70553 MRI BRAIN STEM W/O & W/DYE: CPT

## 2019-02-14 PROCEDURE — 95886 MUSC TEST DONE W/N TEST COMP: CPT | Performed by: PSYCHIATRY & NEUROLOGY

## 2019-02-14 PROCEDURE — 95913 NRV CNDJ TEST 13/> STUDIES: CPT

## 2019-02-14 PROCEDURE — 95913 NRV CNDJ TEST 13/> STUDIES: CPT | Performed by: PSYCHIATRY & NEUROLOGY

## 2019-02-14 PROCEDURE — 95886 MUSC TEST DONE W/N TEST COMP: CPT

## 2019-02-14 PROCEDURE — 6360000004 HC RX CONTRAST MEDICATION: Performed by: NURSE PRACTITIONER

## 2019-02-14 PROCEDURE — A9577 INJ MULTIHANCE: HCPCS | Performed by: NURSE PRACTITIONER

## 2019-02-14 RX ADMIN — GADOBENATE DIMEGLUMINE 12 ML: 529 INJECTION, SOLUTION INTRAVENOUS at 10:02

## 2019-03-29 ENCOUNTER — OFFICE VISIT (OUTPATIENT)
Dept: OTOLARYNGOLOGY | Facility: CLINIC | Age: 19
End: 2019-03-29

## 2019-03-29 VITALS
DIASTOLIC BLOOD PRESSURE: 78 MMHG | TEMPERATURE: 98 F | SYSTOLIC BLOOD PRESSURE: 120 MMHG | BODY MASS INDEX: 20.57 KG/M2 | WEIGHT: 128 LBS | HEIGHT: 66 IN

## 2019-03-29 DIAGNOSIS — H65.33 CHRONIC MUCOID OTITIS MEDIA OF BOTH EARS: ICD-10-CM

## 2019-03-29 DIAGNOSIS — H69.83 DYSFUNCTION OF BOTH EUSTACHIAN TUBES: Primary | ICD-10-CM

## 2019-03-29 DIAGNOSIS — H92.13 OTORRHEA OF BOTH EARS: ICD-10-CM

## 2019-03-29 PROCEDURE — 99213 OFFICE O/P EST LOW 20 MIN: CPT | Performed by: PHYSICIAN ASSISTANT

## 2019-03-31 PROBLEM — H66.90 CHRONIC OTITIS MEDIA: Status: ACTIVE | Noted: 2019-03-31

## 2019-03-31 PROBLEM — H92.13 OTORRHEA OF BOTH EARS: Status: ACTIVE | Noted: 2019-03-31

## 2019-04-18 ENCOUNTER — OFFICE VISIT (OUTPATIENT)
Dept: OTOLARYNGOLOGY | Facility: CLINIC | Age: 19
End: 2019-04-18

## 2019-04-18 VITALS
DIASTOLIC BLOOD PRESSURE: 71 MMHG | HEART RATE: 78 BPM | TEMPERATURE: 98.2 F | WEIGHT: 128 LBS | HEIGHT: 66 IN | BODY MASS INDEX: 20.57 KG/M2 | SYSTOLIC BLOOD PRESSURE: 124 MMHG | RESPIRATION RATE: 18 BRPM

## 2019-04-18 DIAGNOSIS — H69.83 DYSFUNCTION OF BOTH EUSTACHIAN TUBES: Primary | ICD-10-CM

## 2019-04-18 DIAGNOSIS — H65.33 CHRONIC MUCOID OTITIS MEDIA OF BOTH EARS: ICD-10-CM

## 2019-04-18 DIAGNOSIS — Z96.22 S/P BILATERAL MYRINGOTOMY WITH TUBE PLACEMENT: ICD-10-CM

## 2019-04-18 PROCEDURE — 99213 OFFICE O/P EST LOW 20 MIN: CPT | Performed by: NURSE PRACTITIONER

## 2019-04-18 NOTE — PROGRESS NOTES
Faina Sommer, KARIN   Chief complaint: follow-up myringotomy tubes     History of Present Illness  Lynette Faulkner is a 18 y.o. female who presents status post bilateral myringotomy tube insertion by Dr. Nuno on 1/5/18. She has had problems with otorrhea and granulation polyps. The symptoms are localized to both ears. The patient has had moderate symptoms. The symptoms have been resolved for the last few days. There have been no identified factors that aggravate the symptoms. The patient has been trying Neomycin/ Polymyxin (Cortisporin)  with a resolution of symptoms.    Review of Systems  HENT: as per HPI  CONSTITUTIONAL: No fever, chills  GI: no nausea or vomiting    Past History:  Past medical and surgical history, family history and social history reviewed and updated when appropriate.  Current medications and allergies reviewed and updated when appropriate.  Allergies:  Amoxicillin        Vital Signs  Temp:  [98.2 °F (36.8 °C)] 98.2 °F (36.8 °C)  Heart Rate:  [78] 78  Resp:  [18] 18  BP: (124)/(71) 124/71      Physical Exam  CONSTITUTIONAL: well nourished, well-developed, alert, oriented, in no acute distress   COMMUNICATION AND VOICE: able to communicate normally for age, normal voice quality  HEAD: normocephalic, no lesions, atraumatic, no tenderness, no masses   FACE: appearance normal, no lesions, no tenderness, no deformities, facial motion symmetric  EYES: ocular motility normal, eyelids normal, orbits normal, no proptosis, conjunctiva normal , pupils equal, round   EARS:  Hearing: response to conversational voice normal bilaterally   External Ears: auricles without lesions  Otoscopic:   EXTERNAL EAR CANALS: normal ear canals without stenosis or significant cerumen  RIGHT TYMPANIC MEMBRANE: right myringotomy tube in place, dry and patent, no granulation polyp  LEFT TYMPANIC MEMBRANE: left myringotomy tube in place, dry and patent, with bloody crusting surrounding the base of the tube, no granulation polyp    NOSE:  External Nose: structure normal, no tenderness on palpation, no nasal discharge, no lesions, no evidence of trauma, nostrils patent   ORAL:  Lips: upper and lower lips without lesion   NECK: neck appearance normal  CHEST/RESPIRATORY: respiratory effort normal, normal chest excursion  CARDIOVASCULAR: extremities without cyanosis or edema   NEUROLOGIC/PSYCHIATRIC: oriented appropriately, mood normal, affect appropriate, CN II-XII intact grossly    I have reviewed the patients old records in the chart.      Assessment   1. Dysfunction of both eustachian tubes    2. Chronic mucoid otitis media of both ears    3. S/p bilateral myringotomy with tube placement          Plan   Dry ear precautions.  Call for problems, especially ear pain or pressure, ear drainage, fever, or decreased hearing.  I discussed the patients findings and my recommendations and answered questions.     Return in about 4 months (around 8/18/2019), or if symptoms worsen or fail to improve, for Recheck.    Faina Sommer, APRN  04/18/19  11:48 AM

## 2019-09-17 ENCOUNTER — OFFICE VISIT (OUTPATIENT)
Dept: OTOLARYNGOLOGY | Facility: CLINIC | Age: 19
End: 2019-09-17

## 2019-09-17 VITALS
BODY MASS INDEX: 20.57 KG/M2 | SYSTOLIC BLOOD PRESSURE: 132 MMHG | TEMPERATURE: 98 F | DIASTOLIC BLOOD PRESSURE: 76 MMHG | HEART RATE: 80 BPM | RESPIRATION RATE: 20 BRPM | HEIGHT: 66 IN | WEIGHT: 128 LBS

## 2019-09-17 DIAGNOSIS — J01.90 ACUTE NON-RECURRENT SINUSITIS, UNSPECIFIED LOCATION: ICD-10-CM

## 2019-09-17 DIAGNOSIS — H65.33 CHRONIC MUCOID OTITIS MEDIA OF BOTH EARS: ICD-10-CM

## 2019-09-17 DIAGNOSIS — Z96.22 S/P BILATERAL MYRINGOTOMY WITH TUBE PLACEMENT: ICD-10-CM

## 2019-09-17 DIAGNOSIS — H69.83 DYSFUNCTION OF BOTH EUSTACHIAN TUBES: Primary | ICD-10-CM

## 2019-09-17 PROCEDURE — 99213 OFFICE O/P EST LOW 20 MIN: CPT | Performed by: NURSE PRACTITIONER

## 2019-09-17 RX ORDER — CLARITHROMYCIN 500 MG/1
500 TABLET, COATED ORAL 2 TIMES DAILY
Qty: 20 TABLET | Refills: 0 | Status: SHIPPED | OUTPATIENT
Start: 2019-09-17 | End: 2019-09-27

## 2020-02-19 PROCEDURE — 88304 TISSUE EXAM BY PATHOLOGIST: CPT | Performed by: SURGERY

## 2020-02-20 ENCOUNTER — LAB REQUISITION (OUTPATIENT)
Dept: LAB | Facility: HOSPITAL | Age: 20
End: 2020-02-20

## 2020-02-20 DIAGNOSIS — Z00.00 ENCOUNTER FOR GENERAL ADULT MEDICAL EXAMINATION WITHOUT ABNORMAL FINDINGS: ICD-10-CM

## 2020-02-21 LAB
LAB AP CASE REPORT: NORMAL
LAB AP CLINICAL INFORMATION: NORMAL
PATH REPORT.FINAL DX SPEC: NORMAL
PATH REPORT.GROSS SPEC: NORMAL

## 2020-05-04 ENCOUNTER — TELEPHONE (OUTPATIENT)
Dept: OTOLARYNGOLOGY | Facility: CLINIC | Age: 20
End: 2020-05-04

## 2020-05-04 RX ORDER — CIPROFLOXACIN AND DEXAMETHASONE 3; 1 MG/ML; MG/ML
4 SUSPENSION/ DROPS AURICULAR (OTIC) 4 TIMES DAILY
Qty: 7.5 ML | Refills: 0 | Status: SHIPPED | OUTPATIENT
Start: 2020-05-04 | End: 2021-07-12

## 2020-07-22 ENCOUNTER — OFFICE VISIT (OUTPATIENT)
Dept: OTOLARYNGOLOGY | Facility: CLINIC | Age: 20
End: 2020-07-22

## 2020-07-22 VITALS
DIASTOLIC BLOOD PRESSURE: 89 MMHG | WEIGHT: 147 LBS | BODY MASS INDEX: 23.63 KG/M2 | HEART RATE: 91 BPM | SYSTOLIC BLOOD PRESSURE: 149 MMHG | HEIGHT: 66 IN | TEMPERATURE: 98.8 F

## 2020-07-22 DIAGNOSIS — H61.23 EXCESSIVE CERUMEN IN EAR CANAL, BILATERAL: ICD-10-CM

## 2020-07-22 DIAGNOSIS — H65.33 CHRONIC MUCOID OTITIS MEDIA OF BOTH EARS: ICD-10-CM

## 2020-07-22 DIAGNOSIS — H69.83 DYSFUNCTION OF BOTH EUSTACHIAN TUBES: Primary | ICD-10-CM

## 2020-07-22 DIAGNOSIS — Z96.22 S/P BILATERAL MYRINGOTOMY WITH TUBE PLACEMENT: ICD-10-CM

## 2020-07-22 PROCEDURE — 99213 OFFICE O/P EST LOW 20 MIN: CPT | Performed by: NURSE PRACTITIONER

## 2020-07-22 NOTE — PROGRESS NOTES
"KARIN Guillen   Chief complaint: follow-up myringotomy tubes     History of Present Illness  Lynette Faulkner is a 19 y.o. female who presents status post bilateral myringotomy tube insertion by Dr. Nuno on 1/5/18.  She has had a relatively normal postoperative course and denies any current complaints.  She denies otalgia, otorrhea, or change in hearing.  She does feel her ears may need to be cleaned today.    Review of Systems  HENT: as per HPI  CONSTITUTIONAL: No fever, chills  GI: no nausea or vomiting    Past History:  Past medical and surgical history, family history and social history reviewed and updated when appropriate.  Current medications and allergies reviewed and updated when appropriate.  Allergies:  Amoxicillin        Vital Signs  Temp:  [98.8 °F (37.1 °C)] 98.8 °F (37.1 °C)  Heart Rate:  [91] 91  BP: (149)/(89) 149/89 /89   Pulse 91   Temp 98.8 °F (37.1 °C) (Temporal)   Ht 167.6 cm (66\")   Wt 66.7 kg (147 lb)   BMI 23.73 kg/m²     Physical Exam  CONSTITUTIONAL: well nourished, well-developed, alert, oriented, in no acute distress   COMMUNICATION AND VOICE: able to communicate normally for age, normal voice quality  HEAD: normocephalic, no lesions, atraumatic, no tenderness, no masses   FACE: appearance normal, no lesions, no deformities, facial motion symmetric  EYES: ocular motility normal, eyelids normal, orbits normal, no proptosis, conjunctiva normal , pupils equal, round   EARS:  Hearing: response to conversational voice normal bilaterally   External Ears: auricles without lesions  Otoscopic:   EXTERNAL EAR CANALS: moderate amount of cerumen removed under microscopy with curette  RIGHT TYMPANIC MEMBRANE: right myringotomy tube in place, dry and patent  LEFT TYMPANIC MEMBRANE: left myringotomy tube in place, dry and patent   NOSE:  External Nose: structure normal, no tenderness on palpation, no nasal discharge, no lesions, no evidence of trauma, nostrils patent   ORAL:  Lips: upper " and lower lips without lesion   NECK: neck appearance normal  CHEST/RESPIRATORY: respiratory effort normal, normal chest excursion  CARDIOVASCULAR: extremities without cyanosis or edema   NEUROLOGIC/PSYCHIATRIC: oriented appropriately, mood normal, affect appropriate, CN II-XII intact grossly    I have reviewed the patients old records in the chart.         Assessment   1. Dysfunction of both eustachian tubes    2. Chronic mucoid otitis media of both ears    3. S/p bilateral myringotomy with tube placement    4. Excessive cerumen in ear canal, bilateral        Plan      Dry ear precautions.  Call for problems, especially ear pain or pressure, ear drainage, fever, or decreased hearing.  I discussed the patients findings and my recommendations and answered questions.     Return in about 6 months (around 1/22/2021), or if symptoms worsen or fail to improve, for Recheck, Cerumen removal.    Faina Sommer, APRN

## 2020-11-03 ENCOUNTER — HOSPITAL ENCOUNTER (OUTPATIENT)
Dept: NON INVASIVE DIAGNOSTICS | Age: 20
Discharge: HOME | End: 2020-11-03
Payer: COMMERCIAL

## 2020-11-03 PROCEDURE — 93225 XTRNL ECG REC<48 HRS REC: CPT

## 2020-11-03 PROCEDURE — 93226 XTRNL ECG REC<48 HR SCAN A/R: CPT

## 2021-02-12 ENCOUNTER — OFFICE VISIT (OUTPATIENT)
Dept: OTOLARYNGOLOGY | Facility: CLINIC | Age: 21
End: 2021-02-12

## 2021-02-12 VITALS
WEIGHT: 147 LBS | BODY MASS INDEX: 23.63 KG/M2 | SYSTOLIC BLOOD PRESSURE: 113 MMHG | DIASTOLIC BLOOD PRESSURE: 68 MMHG | HEIGHT: 66 IN | HEART RATE: 105 BPM | TEMPERATURE: 97.7 F

## 2021-02-12 DIAGNOSIS — H61.23 EXCESSIVE CERUMEN IN EAR CANAL, BILATERAL: ICD-10-CM

## 2021-02-12 DIAGNOSIS — H65.33 CHRONIC MUCOID OTITIS MEDIA OF BOTH EARS: ICD-10-CM

## 2021-02-12 DIAGNOSIS — H69.83 DYSFUNCTION OF BOTH EUSTACHIAN TUBES: Primary | ICD-10-CM

## 2021-02-12 DIAGNOSIS — Z96.22 S/P BILATERAL MYRINGOTOMY WITH TUBE PLACEMENT: ICD-10-CM

## 2021-02-12 PROCEDURE — 99213 OFFICE O/P EST LOW 20 MIN: CPT | Performed by: NURSE PRACTITIONER

## 2021-02-12 RX ORDER — ATENOLOL 25 MG/1
TABLET ORAL
COMMUNITY
Start: 2021-02-05

## 2021-02-12 NOTE — PROGRESS NOTES
"KARIN Guillen   Chief complaint: follow-up myringotomy tubes     History of Present Illness  Lynette Faulkner is a 20 y.o. female who presents status post bilateral myringotomy tube insertion by Dr. Nuno on 1/5/18.  She has had a relatively normal postoperative course and denies any current complaints.  She denies otalgia, otorrhea, or change in hearing.  She does feel her ears may need to be cleaned today.    Review of Systems  HENT: as per HPI  CONSTITUTIONAL: No fever, chills  GI: no nausea or vomiting    Past History:  Past medical and surgical history, family history and social history reviewed and updated when appropriate.  Current medications and allergies reviewed and updated when appropriate.  Allergies:  Amoxicillin        Vital Signs  Temp:  [97.7 °F (36.5 °C)] 97.7 °F (36.5 °C)  Heart Rate:  [105] 105  BP: (113)/(68) 113/68 /68   Pulse 105   Temp 97.7 °F (36.5 °C) (Temporal)   Ht 167.6 cm (66\")   Wt 66.7 kg (147 lb)   BMI 23.73 kg/m²     Physical Exam  CONSTITUTIONAL: well nourished, well-developed, alert, oriented, in no acute distress   COMMUNICATION AND VOICE: able to communicate normally for age, normal voice quality  HEAD: normocephalic, no lesions, atraumatic, no tenderness, no masses   FACE: appearance normal, no lesions, no deformities, facial motion symmetric  EYES: ocular motility normal, eyelids normal, orbits normal, no proptosis, conjunctiva normal , pupils equal, round   EARS:  Hearing: response to conversational voice normal bilaterally   External Ears: auricles without lesions  Otoscopic:   EXTERNAL EAR CANALS: moderate amount of cerumen removed under microscopy with suction  RIGHT TYMPANIC MEMBRANE: right myringotomy tube in place, dry and patent  LEFT TYMPANIC MEMBRANE: left myringotomy tube in place, dry and patent   NOSE:  External Nose: structure normal, no tenderness on palpation, no nasal discharge, no lesions, no evidence of trauma, nostrils patent   ORAL:  Lips: " upper and lower lips without lesion   NECK: neck appearance normal  CHEST/RESPIRATORY: respiratory effort normal, normal chest excursion  CARDIOVASCULAR: extremities without cyanosis or edema   NEUROLOGIC/PSYCHIATRIC: oriented appropriately, mood normal, affect appropriate, CN II-XII intact grossly    I have reviewed the patients old records in the chart.         Assessment   1. Dysfunction of both eustachian tubes    2. Chronic mucoid otitis media of both ears    3. S/p bilateral myringotomy with tube placement    4. Excessive cerumen in ear canal, bilateral        Plan      Dry ear precautions.  Call for problems, especially ear pain or pressure, ear drainage, fever, or decreased hearing.  I discussed the patients findings and my recommendations and answered questions.     Return in about 6 months (around 8/12/2021), or if symptoms worsen or fail to improve, for Recheck.    Faina Sommer, APRN

## 2021-07-12 ENCOUNTER — OFFICE VISIT (OUTPATIENT)
Dept: OTOLARYNGOLOGY | Facility: CLINIC | Age: 21
End: 2021-07-12

## 2021-07-12 VITALS
BODY MASS INDEX: 23.63 KG/M2 | DIASTOLIC BLOOD PRESSURE: 63 MMHG | HEART RATE: 93 BPM | TEMPERATURE: 98.6 F | WEIGHT: 147 LBS | HEIGHT: 66 IN | SYSTOLIC BLOOD PRESSURE: 126 MMHG

## 2021-07-12 DIAGNOSIS — H69.83 DYSFUNCTION OF BOTH EUSTACHIAN TUBES: Primary | ICD-10-CM

## 2021-07-12 DIAGNOSIS — H65.33 CHRONIC MUCOID OTITIS MEDIA OF BOTH EARS: ICD-10-CM

## 2021-07-12 DIAGNOSIS — Z96.22 S/P BILATERAL MYRINGOTOMY WITH TUBE PLACEMENT: ICD-10-CM

## 2021-07-12 PROCEDURE — 99213 OFFICE O/P EST LOW 20 MIN: CPT | Performed by: NURSE PRACTITIONER

## 2021-07-12 RX ORDER — CIPROFLOXACIN AND DEXAMETHASONE 3; 1 MG/ML; MG/ML
4 SUSPENSION/ DROPS AURICULAR (OTIC) 2 TIMES DAILY
Qty: 7.5 ML | Refills: 0 | Status: SHIPPED | OUTPATIENT
Start: 2021-07-12 | End: 2021-07-19

## 2021-07-12 RX ORDER — SPIRONOLACTONE 50 MG/1
TABLET, FILM COATED ORAL
COMMUNITY
Start: 2021-06-07 | End: 2022-09-30

## 2021-07-12 NOTE — PROGRESS NOTES
"KARIN Guillen   Chief complaint: follow-up myringotomy tubes     History of Present Illness  Lynette Faulkner is a 20 y.o. female who presents status post bilateral myringotomy tube insertion by Dr. Nuno on 1/5/18.  She has had a relatively normal postoperative course and denies any current complaints.  She denies otalgia, otorrhea, or change in hearing.     Past History:  Past medical and surgical history, family history and social history reviewed and updated when appropriate.  Current medications and allergies reviewed and updated when appropriate.  Allergies:  Amoxicillin        Vital Signs  Temp:  [98.6 °F (37 °C)] 98.6 °F (37 °C)  Heart Rate:  [93] 93  BP: (126)/(63) 126/63 /63   Pulse 93   Temp 98.6 °F (37 °C) (Temporal)   Ht 167.6 cm (66\")   Wt 66.7 kg (147 lb)   BMI 23.73 kg/m²     Physical Exam  CONSTITUTIONAL: well nourished, well-developed, alert, oriented, in no acute distress   COMMUNICATION AND VOICE: able to communicate normally for age, normal voice quality  HEAD: normocephalic, no lesions, atraumatic, no tenderness, no masses   FACE: appearance normal, no lesions, no deformities, facial motion symmetric  EYES: ocular motility normal, eyelids normal, orbits normal, no proptosis, conjunctiva normal , pupils equal, round   EARS:  Hearing: response to conversational voice normal bilaterally   External Ears: auricles without lesions  Otoscopic:   EXTERNAL EAR CANALS: moderate amount of cerumen removed under microscopy with suction and forceps from the right EAC  RIGHT TYMPANIC MEMBRANE: right myringotomy tube in place, dry and patent  LEFT TYMPANIC MEMBRANE: left myringotomy tube in place, dry and patent   NOSE:  External Nose: structure normal, no tenderness on palpation, no nasal discharge, no lesions, no evidence of trauma, nostrils patent   ORAL:  Lips: upper and lower lips without lesion   NECK: neck appearance normal  CHEST/RESPIRATORY: respiratory effort normal, normal chest " excursion  CARDIOVASCULAR: extremities without cyanosis or edema   NEUROLOGIC/PSYCHIATRIC: oriented appropriately, mood normal, affect appropriate, CN II-XII intact grossly    I have reviewed the patients old records in the chart.         Assessment   1. Dysfunction of both eustachian tubes    2. Chronic mucoid otitis media of both ears    3. S/p bilateral myringotomy with tube placement        Plan      Dry ear precautions.  Prescription for Ciprodex to have on hand should she develop otorrhea [place].  Call for problems, especially ear pain or pressure, ear drainage, fever, or decreased hearing.  I discussed the patients findings and my recommendations and answered questions.     New Medications Ordered This Visit   Medications   • ciprofloxacin-dexamethasone (CIPRODEX) 0.3-0.1 % otic suspension     Sig: Administer 4 drops into ear(s) as directed by provider 2 (Two) Times a Day for 7 days.     Dispense:  7.5 mL     Refill:  0       Return in about 6 months (around 1/12/2022) for Recheck.    Faina Sommer, APRN

## 2022-01-12 ENCOUNTER — OFFICE VISIT (OUTPATIENT)
Dept: OTOLARYNGOLOGY | Facility: CLINIC | Age: 22
End: 2022-01-12

## 2022-01-12 VITALS
TEMPERATURE: 98.6 F | HEIGHT: 66 IN | WEIGHT: 143 LBS | BODY MASS INDEX: 22.98 KG/M2 | HEART RATE: 83 BPM | SYSTOLIC BLOOD PRESSURE: 135 MMHG | DIASTOLIC BLOOD PRESSURE: 73 MMHG

## 2022-01-12 DIAGNOSIS — J31.0 RHINOSINUSITIS: ICD-10-CM

## 2022-01-12 DIAGNOSIS — Z96.22 S/P BILATERAL MYRINGOTOMY WITH TUBE PLACEMENT: Primary | ICD-10-CM

## 2022-01-12 DIAGNOSIS — J32.9 RHINOSINUSITIS: ICD-10-CM

## 2022-01-12 DIAGNOSIS — H65.33 CHRONIC MUCOID OTITIS MEDIA OF BOTH EARS: ICD-10-CM

## 2022-01-12 PROCEDURE — 99213 OFFICE O/P EST LOW 20 MIN: CPT | Performed by: NURSE PRACTITIONER

## 2022-01-12 RX ORDER — CLARITHROMYCIN 500 MG/1
500 TABLET, COATED ORAL 2 TIMES DAILY
Qty: 20 TABLET | Refills: 0 | Status: SHIPPED | OUTPATIENT
Start: 2022-01-12 | End: 2022-01-22

## 2022-01-12 NOTE — PATIENT INSTRUCTIONS
CONTACT INFORMATION:  The main office phone number is [phone]. For emergencies after hours and on weekends, this number will convert over to our answering service and the on call provider will answer. Please try to keep non emergent phone calls/ questions to office hours 9am-5pm Monday through Friday.      Privy  As an alternative, you can sign up and use the Epic MyChart system for more direct and quicker access for non emergent questions/ problems.  [employer] allows you to send messages to your doctor, view your test results, renew your prescriptions, schedule appointments, and more. To sign up, go to [employer] and click on the Sign Up Now link in the New User? box. Enter your Privy Activation Code exactly as it appears below along with the last four digits of your Social Security Number and your Date of Birth () to complete the sign-up process. If you do not sign up before the expiration date, you must request a new code.     Privy Activation Code: Activation code not generated  Current Privy Status: Active     If you have questions, you can email [email or web address] or call [phone] to talk to our Privy staff. Remember, Privy is NOT to be used for urgent needs. For medical emergencies, dial 911.     IF YOU SMOKE OR USE TOBACCO PLEASE READ THE FOLLOWING:  Why is smoking bad for me?  Smoking increases the risk of heart disease, lung disease, vascular disease, stroke, and cancer. If you smoke, STOP!        IF YOU SMOKE OR USE TOBACCO PLEASE READ THE FOLLOWING:  Why is smoking bad for me?  Smoking increases the risk of heart disease, lung disease, vascular disease, stroke, and cancer. If you smoke, STOP!     For more information:  Quit Now [place]  -QUIT-NOW  [email or web address]

## 2022-01-12 NOTE — PROGRESS NOTES
YOB: 2000  Location: [facility]  Location Address: [address] [place] [address], [facility], [address] [place] [address]  Location Phone: [phone]    Chief Complaint   Patient presents with   • Follow-up       History of Present Illness  Lynette Faulkner is a 21 y.o. female.  Lynette Faulkner is here for follow up of ENT complaints. The patient has had problems with a history of ear tube placement  She is s/p myringotomy with tube placement . She has had no complaints other than some increased pressure/sinus congestion that started several days ago, which seems to cause some pressure in the ears. She denies ear drainage or hearing changes.   The symptoms are localized to the bilateral ears. The patient has had mild symptoms. The symptoms have been present for the last several days The symptoms are aggravated by  weather change. There have been no factors that have improved the symptoms.       Past Medical History:   Diagnosis Date   • ADD (attention deficit disorder)    • Chronic otitis media    • COVID-19 vaccine series completed    • Eustachian tube dysfunction    • H/O dizziness     from ear problems   • History of ear infections    • Impacted cerumen    • Otalgia    • TMJ (dislocation of temporomandibular joint)        Past Surgical History:   Procedure Laterality Date   • CHOLECYSTECTOMY     • CYST REMOVAL      left shoulder   • EXCISION LESION      left foot benign   • MYRINGOTOMY W/ TUBES Bilateral 2018    Procedure: BILATERAL MYRINGOTOMY WITH INSERTION OF EAR TUBES;  Surgeon: Gama Nuno MD;  Location: [facility];  Service:    • TONSILLECTOMY         Outpatient Medications Marked as Taking for the 22 encounter (Office Visit) with Keshia Alfaro APRN   Medication Sig Dispense Refill   • atenolol (TENORMIN) 25 MG tablet          Amoxicillin    Family History   Problem Relation Age of Onset   • No Known Problems Mother    • No Known Problems Father        Social History     Socioeconomic History    • Marital status: Single   Tobacco Use   • Smoking status: Never Smoker   • Smokeless tobacco: Never Used   Vaping Use   • Vaping Use: Never used   Substance and Sexual Activity   • Alcohol use: No   • Drug use: Defer   • Sexual activity: Defer       Review of Systems   Constitutional: Negative.    HENT: Positive for congestion and sinus pressure. Negative for ear discharge and ear pain.    Eyes: Negative.    Respiratory: Negative.    Cardiovascular: Negative.    Gastrointestinal: Negative.    Endocrine: Negative.    Genitourinary: Negative.    Musculoskeletal: Negative.    Neurological: Negative.        Vitals:    01/12/22 1514   BP: 135/73   Pulse: 83   Temp: 98.6 °F (37 °C)       Body mass index is 23.08 kg/m².    Objective     Physical Exam  Vitals reviewed.   Constitutional:       Appearance: She is normal weight.   HENT:      Head: Normocephalic.      Right Ear: External ear normal. There is impacted cerumen (non occluding removed with curette ). A PE tube (dry and patent ) is present.      Left Ear: External ear normal. A PE tube (dry and patent ) is present.      Nose: Congestion present.      Mouth/Throat:      Lips: Pink.      Mouth: Mucous membranes are moist.      Pharynx: Oropharynx is clear. Uvula midline.   Musculoskeletal:      Cervical back: Full passive range of motion without pain and normal range of motion.   Neurological:      Mental Status: She is alert.         Assessment/Plan   Diagnoses and all orders for this visit:    1. S/p bilateral myringotomy with tube placement (Primary)    2. Chronic mucoid otitis media of both ears    3. Rhinosinusitis    Other orders  -     clarithromycin (Biaxin) 500 MG tablet; Take 1 tablet by mouth 2 (Two) Times a Day for 10 days.  Dispense: 20 tablet; Refill: 0      * Surgery not found *  No orders of the defined types were placed in this encounter.    Return in about 6 months (around 7/12/2022).     Continue dry ear precautions   Call for ear pain, drainage  or changes in hearing     Patient Instructions   CONTACT INFORMATION:  The main office phone number is [phone]. For emergencies after hours and on weekends, this number will convert over to our answering service and the on call provider will answer. Please try to keep non emergent phone calls/ questions to office hours 9am-5pm Monday through Friday.      cafegive  As an alternative, you can sign up and use the Epic MyChart system for more direct and quicker access for non emergent questions/ problems.  [employer] allows you to send messages to your doctor, view your test results, renew your prescriptions, schedule appointments, and more. To sign up, go to [employer] and click on the Sign Up Now link in the New User? box. Enter your cafegive Activation Code exactly as it appears below along with the last four digits of your Social Security Number and your Date of Birth () to complete the sign-up process. If you do not sign up before the expiration date, you must request a new code.     cafegive Activation Code: Activation code not generated  Current cafegive Status: Active     If you have questions, you can email [email or web address] or call [phone] to talk to our cafegive staff. Remember, cafegive is NOT to be used for urgent needs. For medical emergencies, dial 911.     IF YOU SMOKE OR USE TOBACCO PLEASE READ THE FOLLOWING:  Why is smoking bad for me?  Smoking increases the risk of heart disease, lung disease, vascular disease, stroke, and cancer. If you smoke, STOP!        IF YOU SMOKE OR USE TOBACCO PLEASE READ THE FOLLOWING:  Why is smoking bad for me?  Smoking increases the risk of heart disease, lung disease, vascular disease, stroke, and cancer. If you smoke, STOP!     For more information:  [facility]  -QUIT-NOW  [email or web address]

## 2022-03-24 NOTE — PROGRESS NOTES
[facility]  3/8/2022    Miguel Thomas  is a 78 y.o. in the  being seen for a acute visit for   Chief Complaint   Patient presents with    Cough    Knee Pain       HPI patient being seen today for acute visit for cough and knee pain. They are eating and drinking at their baseline per nursing. They have had no recent falls with injuries. They are not complaining of any un addressed pain issues. Have had no recent choking or dysphagia issues. NO agitation or unusual mental behavioral issues. No past medical history on file. No past surgical history on file. No family history on file. Social History     Socioeconomic History    Marital status:      Spouse name: Not on file    Number of children: Not on file    Years of education: Not on file    Highest education level: Not on file   Occupational History    Not on file   Tobacco Use    Smoking status: Not on file    Smokeless tobacco: Not on file   Substance and Sexual Activity    Alcohol use: Not on file    Drug use: Not on file    Sexual activity: Not on file   Other Topics Concern    Not on file   Social History Narrative    Not on file     Social Determinants of Health     Financial Resource Strain:     Difficulty of Paying Living Expenses: Not on file   Food Insecurity:     Worried About Running Out of Food in the Last Year: Not on file    Eric of Food in the Last Year: Not on file   Transportation Needs:     Lack of Transportation (Medical): Not on file    Lack of Transportation (Non-Medical):  Not on file   Physical Activity:     Days of Exercise per Week: Not on file    Minutes of Exercise per Session: Not on file   Stress:     Feeling of Stress : Not on file   Social Connections:     Frequency of Communication with Friends and Family: Not on file    Frequency of Social Gatherings with Friends and Family: Not on file    Attends [employer] Services: Not on file    Active Member of Clubs or Organizations: YOB: 2000  Location: [facility]  Location Address: [address] [place] [address], [facility], [address] [place] [address]  Location Phone: [phone]    Chief Complaint   Patient presents with   • Ear Problem       History of Present Illness  Lynette Faulkner is a 17 y.o. female.  Lynette Faulkner is here for evaluation of ENT complaints. The patient has had problems with a history of ear tube placement  The symptoms are not localized to a particular location. The patient has had a resolution of the symptoms. The symptoms have been present for the last several weeks The symptoms are aggravated by  no identifiable factors. The symptoms are improved by myringotomy tube insertion.  She had an episodes of ear pain with a recent allergy flair up but is doing well now.       Past Medical History:   Diagnosis Date   • ADD (attention deficit disorder)    • Chronic otitis media    • Eustachian tube dysfunction    • H/O dizziness     from ear problems   • History of ear infections    • Impacted cerumen    • Otalgia    • TMJ (dislocation of temporomandibular joint)        Past Surgical History:   Procedure Laterality Date   • CYST REMOVAL      left shoulder   • EXCISION LESION      left foot benign   • MYRINGOTOMY W/ TUBES Bilateral 2018    Procedure: BILATERAL MYRINGOTOMY WITH INSERTION OF EAR TUBES;  Surgeon: Gama Nuno MD;  Location: [facility];  Service:    • TONSILLECTOMY         Outpatient Prescriptions Marked as Taking for the 18 encounter (Office Visit) with Anias Dukes APRN   Medication Sig Dispense Refill   • amphetamine-dextroamphetamine XR (ADDERALL XR) 20 MG 24 hr capsule Take 20 mg by mouth Every Morning           Amoxicillin    Family History   Problem Relation Age of Onset   • No Known Problems Mother    • No Known Problems Father        Social History     Social History   • Marital status: Single     Spouse name: N/A   • Number of children: N/A   • Years of education: N/A     Occupational History   •  Not on file    Attends Club or Organization Meetings: Not on file    Marital Status: Not on file   Intimate Partner Violence:     Fear of Current or Ex-Partner: Not on file    Emotionally Abused: Not on file    Physically Abused: Not on file    Sexually Abused: Not on file   Housing Stability:     Unable to Pay for Housing in the Last Year: Not on file    Number of [place] in the Last Year: Not on file    Unstable Housing in the Last Year: Not on file       Allergies: Contrast [iodides], Promethazine, Thiopental, and Trazodone  NF MEDICATIONS REVIEWED    ROS:   Constitutional: There are no reports of behavioral issues, change in appetite, fever, or increased weakness. No bleeding issues. Respiratory: denies SOB, dyspnea, dyspnea on exertion  Cardiovascular: denies CP, lightheadedness, palpitations, PND, orthopnea, or claudication. GI: No N/V/D. : no reports of dysuria, frequency or urgency  Extremities: No reports of pain issues, edema improving to left knee  Psych: Affect pleasant. Mood stable    Physical exam:   Blood pressure 144/90, temp 97.8, heart rate 85, respirations 18, pulse ox 96% room air, weight 141 pounds. Constitutional: Awake and alert and oriented x3 but forgetful, sitting up in recliner, general weakness  HEENT: Normocephalic, hard of hearing,intact facial symmetry, conjunctiva pink, sclera non icteric,  buccal mucosa pink and moist  Speech clear. NECK: - no cervical or clavicular lymphadenopathy, euthyroid, no mass visualized  Cardiovascular: Regular rate, and rhythm. No murmurs, gallops or rubs noted. Respiratory: LCTA, even unlabored respirations, no accessory muscle use noted  GI: abdomen NT, +BS x 4 Q, ND  : No suprapubic discomfort  Extremities: Significant edema to left knee but improved from prior, PPP  SKELETAL: Nonpitting edema left lower extremity bruising left lower extremity.  Limited ROM but spontaneous movement x 4   Psych: pleasant and cooperative with Not on file.     Social History Main Topics   • Smoking status: Never Smoker   • Smokeless tobacco: Never Used   • Alcohol use No   • Drug use: Unknown   • Sexual activity: Defer     Other Topics Concern   • Not on file     Social History Narrative   • No narrative on file       Review of Systems   Constitutional: Negative.    HENT:        SEE HPI   Eyes: Negative.    Respiratory: Negative.    Cardiovascular: Negative.    Gastrointestinal: Negative.    Endocrine: Negative.    Genitourinary: Negative.    Musculoskeletal: Negative.    Skin: Negative.    Allergic/Immunologic: Negative.    Neurological: Negative.    Hematological: Negative.    Psychiatric/Behavioral: Negative.        Vitals:    08/20/18 1514   Temp: 97.9 °F (36.6 °C)       Body mass index is 20.82 kg/m².    Objective     Physical Exam  CONSTITUTIONAL: well nourished, alert, oriented, in no acute distress     COMMUNICATION AND VOICE: able to communicate normally, normal voice quality    HEAD: normocephalic, no lesions, atraumatic, no tenderness, no masses     FACE: appearance normal, no lesions, no tenderness, no deformities, facial motion symmetric    SALIVARY GLANDS: parotid glands with no tenderness, no swelling, no masses, submandibular glands with normal size, nontender    EYES: ocular motility normal, eyelids normal, orbits normal, no proptosis, conjunctiva normal , pupils equal, round     EARS:  Hearing: response to conversational voice normal bilaterally   External Ears: auricles without lesions  Otoscopic: bilateral TMs with intact and patent PET  Mild  Cerumen to bilateral EACs removed with suction    NOSE:  External Nose: structure normal, no tenderness on palpation, no nasal discharge, no lesions, no evidence of trauma, nostrils patent   Intranasal Exam: nasal mucosa normal, vestibule within normal limits, inferior turbinate normal, nasal septum midline     ORAL:  Lips: upper and lower lips without lesion   Teeth: dentition within normal  exam, good judgement, normal thought content  SKIN: no gross skin lesions noted on visualized skin, warm and dry. Scattered ecchymotic areas to the residents neck arm and leg on left side of her body various stages of healing. ASSESSMENT:     Diagnosis Orders   1. Cough     2. Acute pain of left knee         PLAN:  Pt/POA agrees with POC     Robitussin 10 mL p.o. every 6 hours as needed  BMP CBC with differential in the a.m. on Thursday        ________________________    Russ Esqueda.  Ronnie NÚÑEZ, [address], [address] [place] [address]  Office [phone]  Fax [phone] limits for age   Gums: gingivae healthy   Oral Mucosa: oral mucosa normal, no mucosal lesions   Floor of Mouth: Warthin’s duct patent, mucosa normal  Tongue: lingual mucosa normal without lesions, normal tongue mobility   Palate: soft and hard palates with normal mucosa and structure  Oropharynx: oropharyngeal mucosa normal    NECK: neck appearance normal, no mass,  noted without erythema or tenderness    LYMPH NODES: no lymphadenopathy    CHEST/RESPIRATORY: respiratory effort normal,    CARDIOVASCULAR:  extremities without cyanosis or edema      NEUROLOGIC/PSYCHIATRIC: oriented to time, place and person, mood normal, affect appropriate, CN II-XII intact grossly    Assessment/Plan   Lynette was seen today for ear problem.    Diagnoses and all orders for this visit:    Bilateral impacted cerumen    Dysfunction of both eustachian tubes    Chronic mucoid otitis media of both ears    Other orders  -     neomycin-polymyxin-hydrocortisone (CORTISPORIN) 1 % solution otic solution; Administer 3 drops into both ears 2 (Two) Times a Day.      * Surgery not found *  No orders of the defined types were placed in this encounter.    Return in about 3 months (around 11/20/2018).       Patient Instructions   Dry ear precautions    Call for problems or worsening symptoms

## 2022-09-30 ENCOUNTER — APPOINTMENT (OUTPATIENT)
Dept: CT IMAGING | Facility: HOSPITAL | Age: 22
End: 2022-09-30

## 2022-09-30 ENCOUNTER — APPOINTMENT (OUTPATIENT)
Dept: GENERAL RADIOLOGY | Facility: HOSPITAL | Age: 22
End: 2022-09-30

## 2022-09-30 ENCOUNTER — HOSPITAL ENCOUNTER (EMERGENCY)
Facility: HOSPITAL | Age: 22
Discharge: HOME | End: 2022-09-30
Admitting: FAMILY MEDICINE

## 2022-09-30 VITALS
RESPIRATION RATE: 14 BRPM | DIASTOLIC BLOOD PRESSURE: 77 MMHG | WEIGHT: 135 LBS | TEMPERATURE: 97.4 F | OXYGEN SATURATION: 100 % | BODY MASS INDEX: 21.69 KG/M2 | HEIGHT: 66 IN | SYSTOLIC BLOOD PRESSURE: 127 MMHG | HEART RATE: 88 BPM

## 2022-09-30 DIAGNOSIS — T07.XXXA ABRASIONS OF MULTIPLE SITES: ICD-10-CM

## 2022-09-30 DIAGNOSIS — S20.219A CONTUSION OF CHEST WALL, UNSPECIFIED LATERALITY, INITIAL ENCOUNTER: ICD-10-CM
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
## 2022-09-30 DIAGNOSIS — S40.022A CONTUSION OF LEFT UPPER EXTREMITY, INITIAL ENCOUNTER: ICD-10-CM

## 2022-09-30 DIAGNOSIS — T07.XXXA MULTIPLE CONTUSIONS: Primary | ICD-10-CM

## 2022-09-30 LAB
ALBUMIN SERPL-MCNC: 4.7 G/DL (ref 3.5–5.2)
ALBUMIN/GLOB SERPL: 2.1 G/DL
ALP SERPL-CCNC: 89 U/L (ref 39–117)
ALT SERPL W P-5'-P-CCNC: 14 U/L (ref 1–33)
ANION GAP SERPL CALCULATED.3IONS-SCNC: 10 MMOL/L (ref 5–15)
AST SERPL-CCNC: 23 U/L (ref 1–32)
BACTERIA UR QL AUTO: ABNORMAL /HPF
BASOPHILS # BLD AUTO: 0.05 10*3/MM3 (ref 0–0.2)
BASOPHILS NFR BLD AUTO: 0.6 % (ref 0–1.5)
BILIRUB SERPL-MCNC: 0.3 MG/DL (ref 0–1.2)
BILIRUB UR QL STRIP: NEGATIVE
BUN SERPL-MCNC: 13 MG/DL (ref 6–20)
BUN/CREAT SERPL: 27.7 (ref 7–25)
CALCIUM SPEC-SCNC: 9.2 MG/DL (ref 8.6–10.5)
CHLORIDE SERPL-SCNC: 105 MMOL/L (ref 98–107)
CLARITY UR: CLEAR
CO2 SERPL-SCNC: 26 MMOL/L (ref 22–29)
COLOR UR: ABNORMAL
CREAT SERPL-MCNC: 0.47 MG/DL (ref 0.57–1)
DEPRECATED RDW RBC AUTO: 41.3 FL (ref 37–54)
EGFRCR SERPLBLD CKD-EPI 2021: 136.5 ML/MIN/1.73
EOSINOPHIL # BLD AUTO: 0.15 10*3/MM3 (ref 0–0.4)
EOSINOPHIL NFR BLD AUTO: 1.8 % (ref 0.3–6.2)
ERYTHROCYTE [DISTWIDTH] IN BLOOD BY AUTOMATED COUNT: 12.9 % (ref 12.3–15.4)
GLOBULIN UR ELPH-MCNC: 2.2 GM/DL
GLUCOSE SERPL-MCNC: 101 MG/DL (ref 65–99)
GLUCOSE UR STRIP-MCNC: NEGATIVE MG/DL
HCG SERPL QL: NEGATIVE
HCT VFR BLD AUTO: 37.6 % (ref 34–46.6)
HGB BLD-MCNC: 12.2 G/DL (ref 12–15.9)
HGB UR QL STRIP.AUTO: ABNORMAL
HYALINE CASTS UR QL AUTO: ABNORMAL /LPF
IMM GRANULOCYTES # BLD AUTO: 0.06 10*3/MM3 (ref 0–0.05)
IMM GRANULOCYTES NFR BLD AUTO: 0.7 % (ref 0–0.5)
KETONES UR QL STRIP: NEGATIVE
LEUKOCYTE ESTERASE UR QL STRIP.AUTO: NEGATIVE
LIPASE SERPL-CCNC: 36 U/L (ref 13–60)
LYMPHOCYTES # BLD AUTO: 1.65 10*3/MM3 (ref 0.7–3.1)
LYMPHOCYTES NFR BLD AUTO: 19.3 % (ref 19.6–45.3)
MCH RBC QN AUTO: 28.6 PG (ref 26.6–33)
MCHC RBC AUTO-ENTMCNC: 32.4 G/DL (ref 31.5–35.7)
MCV RBC AUTO: 88.1 FL (ref 79–97)
MONOCYTES # BLD AUTO: 0.68 10*3/MM3 (ref 0.1–0.9)
MONOCYTES NFR BLD AUTO: 8 % (ref 5–12)
NEUTROPHILS NFR BLD AUTO: 5.96 10*3/MM3 (ref 1.7–7)
NEUTROPHILS NFR BLD AUTO: 69.6 % (ref 42.7–76)
NITRITE UR QL STRIP: NEGATIVE
NRBC BLD AUTO-RTO: 0 /100 WBC (ref 0–0.2)
PH UR STRIP.AUTO: 5.5 [PH] (ref 5–8)
PLATELET # BLD AUTO: 242 10*3/MM3 (ref 140–450)
PMV BLD AUTO: 8.8 FL (ref 6–12)
POTASSIUM SERPL-SCNC: 3.6 MMOL/L (ref 3.5–5.2)
PROT SERPL-MCNC: 6.9 G/DL (ref 6–8.5)
PROT UR QL STRIP: NEGATIVE
RBC # BLD AUTO: 4.27 10*6/MM3 (ref 3.77–5.28)
RBC # UR STRIP: ABNORMAL /HPF
REF LAB TEST METHOD: ABNORMAL
SODIUM SERPL-SCNC: 141 MMOL/L (ref 136–145)
SP GR UR STRIP: 1.01 (ref 1–1.03)
SQUAMOUS #/AREA URNS HPF: ABNORMAL /HPF
TROPONIN T SERPL-MCNC: <0.01 NG/ML (ref 0–0.03)
UROBILINOGEN UR QL STRIP: ABNORMAL
WBC # UR STRIP: ABNORMAL /HPF
WBC NRBC COR # BLD: 8.55 10*3/MM3 (ref 3.4–10.8)

## 2022-09-30 PROCEDURE — 36415 COLL VENOUS BLD VENIPUNCTURE: CPT

## 2022-09-30 PROCEDURE — 93005 ELECTROCARDIOGRAM TRACING: CPT | Performed by: NURSE PRACTITIONER

## 2022-09-30 PROCEDURE — 80053 COMPREHEN METABOLIC PANEL: CPT | Performed by: NURSE PRACTITIONER

## 2022-09-30 PROCEDURE — 73090 X-RAY EXAM OF FOREARM: CPT

## 2022-09-30 PROCEDURE — 85025 COMPLETE CBC W/AUTO DIFF WBC: CPT | Performed by: NURSE PRACTITIONER

## 2022-09-30 PROCEDURE — 25010000002 IOPAMIDOL 61 % SOLUTION: Performed by: NURSE PRACTITIONER

## 2022-09-30 PROCEDURE — 72072 X-RAY EXAM THORAC SPINE 3VWS: CPT

## 2022-09-30 PROCEDURE — 70450 CT HEAD/BRAIN W/O DYE: CPT

## 2022-09-30 PROCEDURE — 96374 THER/PROPH/DIAG INJ IV PUSH: CPT

## 2022-09-30 PROCEDURE — 84484 ASSAY OF TROPONIN QUANT: CPT | Performed by: NURSE PRACTITIONER

## 2022-09-30 PROCEDURE — 72110 X-RAY EXAM L-2 SPINE 4/>VWS: CPT

## 2022-09-30 PROCEDURE — 84703 CHORIONIC GONADOTROPIN ASSAY: CPT | Performed by: NURSE PRACTITIONER

## 2022-09-30 PROCEDURE — 99284 EMERGENCY DEPT VISIT MOD MDM: CPT

## 2022-09-30 PROCEDURE — 72125 CT NECK SPINE W/O DYE: CPT

## 2022-09-30 PROCEDURE — 81001 URINALYSIS AUTO W/SCOPE: CPT | Performed by: NURSE PRACTITIONER

## 2022-09-30 PROCEDURE — 73060 X-RAY EXAM OF HUMERUS: CPT

## 2022-09-30 PROCEDURE — 83690 ASSAY OF LIPASE: CPT | Performed by: NURSE PRACTITIONER

## 2022-09-30 PROCEDURE — 71260 CT THORAX DX C+: CPT

## 2022-09-30 PROCEDURE — 93010 ELECTROCARDIOGRAM REPORT: CPT | Performed by: INTERNAL MEDICINE

## 2022-09-30 PROCEDURE — 74177 CT ABD & PELVIS W/CONTRAST: CPT

## 2022-09-30 PROCEDURE — 25010000002 ONDANSETRON PER 1 MG: Performed by: NURSE PRACTITIONER

## 2022-09-30 PROCEDURE — 25010000002 MORPHINE PER 10 MG: Performed by: NURSE PRACTITIONER

## 2022-09-30 PROCEDURE — 96375 TX/PRO/DX INJ NEW DRUG ADDON: CPT

## 2022-09-30 RX ORDER — IBUPROFEN 800 MG/1
800 TABLET ORAL EVERY 8 HOURS PRN
Qty: 30 TABLET | Refills: 0 | Status: SHIPPED | OUTPATIENT
Start: 2022-09-30

## 2022-09-30 RX ORDER — ONDANSETRON 2 MG/ML
4 INJECTION INTRAMUSCULAR; INTRAVENOUS ONCE
Status: COMPLETED | OUTPATIENT
Start: 2022-09-30 | End: 2022-09-30

## 2022-09-30 RX ORDER — CYCLOBENZAPRINE HCL 10 MG
10 TABLET ORAL 3 TIMES DAILY PRN
Qty: 20 TABLET | Refills: 0 | Status: SHIPPED | OUTPATIENT
Start: 2022-09-30

## 2022-09-30 RX ORDER — HYDROCODONE BITARTRATE AND ACETAMINOPHEN 5; 325 MG/1; MG/1
1 TABLET ORAL EVERY 6 HOURS PRN
Qty: 15 TABLET | Refills: 0 | Status: SHIPPED | OUTPATIENT
Start: 2022-09-30

## 2022-09-30 RX ORDER — MORPHINE SULFATE 2 MG/ML
2 INJECTION, SOLUTION INTRAMUSCULAR; INTRAVENOUS ONCE
Status: COMPLETED | OUTPATIENT
Start: 2022-09-30 | End: 2022-09-30

## 2022-09-30 RX ADMIN — MORPHINE SULFATE 2 MG: 2 INJECTION, SOLUTION INTRAMUSCULAR; INTRAVENOUS at 18:45

## 2022-09-30 RX ADMIN — IOPAMIDOL 100 ML: 612 INJECTION, SOLUTION INTRAVENOUS at 19:51

## 2022-09-30 RX ADMIN — ONDANSETRON 4 MG: 2 INJECTION INTRAMUSCULAR; INTRAVENOUS at 18:45

## 2022-10-01 LAB
QT INTERVAL: 342 MS
QTC INTERVAL: 434 MS

## 2022-10-01 NOTE — DISCHARGE INSTRUCTIONS
Return to ER if symptoms worsen   Ice to areas  Clean wounds with soap and water and apply bacitraicin
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
## 2023-09-07 ENCOUNTER — OFFICE VISIT (OUTPATIENT)
Dept: OTOLARYNGOLOGY | Facility: CLINIC | Age: 23
End: 2023-09-07
Payer: COMMERCIAL

## 2023-09-07 VITALS
HEIGHT: 66 IN | HEART RATE: 84 BPM | DIASTOLIC BLOOD PRESSURE: 75 MMHG | BODY MASS INDEX: 26.36 KG/M2 | WEIGHT: 164 LBS | TEMPERATURE: 98.2 F | SYSTOLIC BLOOD PRESSURE: 121 MMHG

## 2023-09-07 DIAGNOSIS — J32.9 RHINOSINUSITIS: ICD-10-CM

## 2023-09-07 DIAGNOSIS — H65.33 CHRONIC MUCOID OTITIS MEDIA OF BOTH EARS: ICD-10-CM

## 2023-09-07 DIAGNOSIS — Z96.22 S/P BILATERAL MYRINGOTOMY WITH TUBE PLACEMENT: Primary | ICD-10-CM

## 2023-09-07 DIAGNOSIS — H61.23 BILATERAL IMPACTED CERUMEN: ICD-10-CM

## 2023-09-07 DIAGNOSIS — J31.0 RHINOSINUSITIS: ICD-10-CM

## 2023-09-07 NOTE — PROGRESS NOTES
YOB: 2000  Location: [facility]  Location Address: [address] [place] [address], [facility], [address] [place] [address]  Location Phone: [phone]    Chief Complaint   Patient presents with    Ear Problem     Ear infections about 3-4 weeks ago, pt took abx they feel better now; pt would like ear cleaned        History of Present Illness  Lynette Faulkner is a 23 y.o. female.  Lynette Faulkner is here for follow up of ENT complaints. The patient is s/p bilateral myringotomy with pe tube placement in 2018  Patient states she experienced ear pain several weeks ago, she did not have drainage and was seen at a walk in clinic and diagnosed with ear infection. She was given an oral antibiotic as well as steroid shot for symptoms   She has not had f/u or hearing test at our clinic in 18 months        Past Medical History:   Diagnosis Date    ADD (attention deficit disorder)     Chronic otitis media     COVID-19 vaccine series completed     Eustachian tube dysfunction     H/O dizziness     from ear problems    History of ear infections     Impacted cerumen     Otalgia     TMJ (dislocation of temporomandibular joint)        Past Surgical History:   Procedure Laterality Date    CHOLECYSTECTOMY      CYST REMOVAL      left shoulder    EXCISION LESION      left foot benign    MYRINGOTOMY W/ TUBES Bilateral 2018    Procedure: BILATERAL MYRINGOTOMY WITH INSERTION OF EAR TUBES;  Surgeon: Gama Nuno MD;  Location: [facility] OR;  Service:     TONSILLECTOMY         Outpatient Medications Marked as Taking for the 23 encounter (Office Visit) with Keshia Alfaro APRN   Medication Sig Dispense Refill    atenolol (TENORMIN) 25 MG tablet       ibuprofen (ADVIL,MOTRIN) 800 MG tablet Take 1 tablet by mouth Every 8 (Eight) Hours As Needed for Mild Pain. 30 tablet 0       Amoxicillin and Azithromycin    Family History   Problem Relation Age of Onset    No Known Problems Mother     No Known Problems Father        Social History      Socioeconomic History    Marital status: Single   Tobacco Use    Smoking status: Never    Smokeless tobacco: Never   Vaping Use    Vaping Use: Never used   Substance and Sexual Activity    Alcohol use: No    Drug use: Defer    Sexual activity: Defer       Review of Systems   Constitutional: Negative.    HENT:  Positive for hearing loss. Negative for ear pain.      Vitals:    09/07/23 1156   BP: 121/75   Pulse: 84   Temp: 98.2 °F (36.8 °C)       Body mass index is 26.47 kg/m².    Objective     Physical Exam  Vitals reviewed.   Constitutional:       Appearance: Normal appearance. She is normal weight.   HENT:      Head: Normocephalic.      Right Ear: There is impacted cerumen.      Left Ear: There is impacted cerumen.      Ears:      Comments: After cerumen removal left pe tube extruded in cerumen tm intact    Right pe tube dry and patent surrounded by minimal crusted cerumen     Neurological:      Mental Status: She is alert.     Ear Cerumen Removal    Date/Time: 9/7/2023 2:05 PM  Performed by: Keshia Alfaro APRN  Authorized by: Keshia Alfaro APRN   Consent: Verbal consent obtained.  Consent given by: patient  Patient understanding: patient states understanding of the procedure being performed  Patient identity confirmed: verbally with patient    Anesthesia:  Local Anesthetic: none  Location details: left ear and right ear  Patient tolerance: patient tolerated the procedure well with no immediate complications  Procedure type: instrumentation, curette       Assessment & Plan   Diagnoses and all orders for this visit:    1. S/p bilateral myringotomy with tube placement (Primary)  -     Comprehensive Hearing Test; Future    2. Bilateral impacted cerumen    3. Chronic mucoid otitis media of both ears  -     Comprehensive Hearing Test; Future    4. Rhinosinusitis    Other orders  -     Ear Cerumen Removal      * Surgery not found *  Orders Placed This Encounter   Procedures    Ear Cerumen Removal     This order  was created via procedure documentation     Order Specific Question:   Release to patient     Answer:   Routine Release [[id number]]    Comprehensive Hearing Test     Standing Status:   Future     Standing Expiration Date:   9/7/2024     Order Specific Question:   Laterality     Answer:   Bilateral     Order Specific Question:   Release to patient     Answer:   Routine Release [[id number]]     Return in about 3 months (around 12/7/2023) for Recheck.     Hearing test at f/u   Call for new/worsening concerns  Dry ear precautions     Patient Instructions   WATER PRECAUTIONS FOR EARS    Protecting your ears from water may sometimes be necessary for the health of your ears.     > Ear plugs: You may use earplugs: over the counter silicone plugs or a cotton ball coated with vasoline when bathing. If conservative measures are not working, consider obtaining molded earplugs from the audiology department to use while bathing or swimming.   Purchase inexpensive types that are most comfortable for you. You can make your own by using cotton balls mixed with a generous amount of Vaseline petroleum jelly. Gently place these in the ear canal.    > Dry the ear canal: after getting out of the shower or bath, use a hair dryer on low heat blowing in the ear for 10-15 seconds. Pulling gently backwards on the ear straightens the ear canal and allows the air to get further down.    > If you are to use ear drops, please place them in the ear canal and give them a few seconds to run down.  Follow this by blowing in the ear canal with a hair dryer set on low heat for approximately 10 to 15 seconds.  You may do this multiple times during the day to help keep the ear canal dry.    >If you are swimming frequently- place a drop of oil in each ear canal prior to entering water. After you are finished in the water, place a drop of vinegar in each ear canal. Use a hair dryer on low heat to blow in each ear canal for 10-15 seconds to dry ears out.

## 2023-09-07 NOTE — PATIENT INSTRUCTIONS
WATER PRECAUTIONS FOR EARS    Protecting your ears from water may sometimes be necessary for the health of your ears.     > Ear plugs: You may use earplugs: over the counter silicone plugs or a cotton ball coated with vasoline when bathing. If conservative measures are not working, consider obtaining molded earplugs from the audiology department to use while bathing or swimming.   Purchase inexpensive types that are most comfortable for you. You can make your own by using cotton balls mixed with a generous amount of Vaseline petroleum jelly. Gently place these in the ear canal.    > Dry the ear canal: after getting out of the shower or bath, use a hair dryer on low heat blowing in the ear for 10-15 seconds. Pulling gently backwards on the ear straightens the ear canal and allows the air to get further down.    > If you are to use ear drops, please place them in the ear canal and give them a few seconds to run down.  Follow this by blowing in the ear canal with a hair dryer set on low heat for approximately 10 to 15 seconds.  You may do this multiple times during the day to help keep the ear canal dry.    >If you are swimming frequently- place a drop of oil in each ear canal prior to entering water. After you are finished in the water, place a drop of vinegar in each ear canal. Use a hair dryer on low heat to blow in each ear canal for 10-15 seconds to dry ears out.

## 2023-10-13 PROCEDURE — 87624 HPV HI-RISK TYP POOLED RSLT: CPT

## 2023-10-13 PROCEDURE — G0123 SCREEN CERV/VAG THIN LAYER: HCPCS

## 2023-10-13 PROCEDURE — 87591 N.GONORRHOEAE DNA AMP PROB: CPT

## 2023-10-13 PROCEDURE — 87491 CHLMYD TRACH DNA AMP PROBE: CPT

## 2023-10-17 ENCOUNTER — LAB REQUISITION (OUTPATIENT)
Dept: LAB | Facility: HOSPITAL | Age: 23
End: 2023-10-17
Payer: COMMERCIAL

## 2023-10-17 DIAGNOSIS — Z01.419 ENCOUNTER FOR GYNECOLOGICAL EXAMINATION (GENERAL) (ROUTINE) WITHOUT ABNORMAL FINDINGS: ICD-10-CM

## 2023-10-17 LAB
GEN CATEG CVX/VAG CYTO-IMP: NORMAL
HPV I/H RISK 4 DNA CVX QL PROBE+SIG AMP: NOT DETECTED
LAB AP CASE REPORT: NORMAL
LAB AP GYN ADDITIONAL INFORMATION: NORMAL
Lab: NORMAL
PATH INTERP SPEC-IMP: NORMAL
STAT OF ADQ CVX/VAG CYTO-IMP: NORMAL

## 2023-10-18 LAB
C TRACH RRNA CVX QL NAA+PROBE: NOT DETECTED
N GONORRHOEA RRNA SPEC QL NAA+PROBE: NOT DETECTED

## 2023-11-20 ENCOUNTER — PROCEDURE VISIT (OUTPATIENT)
Dept: OTOLARYNGOLOGY | Facility: CLINIC | Age: 23
End: 2023-11-20
Payer: COMMERCIAL

## 2023-11-20 ENCOUNTER — OFFICE VISIT (OUTPATIENT)
Dept: OTOLARYNGOLOGY | Facility: CLINIC | Age: 23
End: 2023-11-20
Payer: COMMERCIAL

## 2023-11-20 VITALS
SYSTOLIC BLOOD PRESSURE: 101 MMHG | WEIGHT: 164 LBS | HEIGHT: 66 IN | HEART RATE: 108 BPM | TEMPERATURE: 98.4 F | DIASTOLIC BLOOD PRESSURE: 71 MMHG | BODY MASS INDEX: 26.36 KG/M2

## 2023-11-20 DIAGNOSIS — Z96.22 S/P MYRINGOTOMY WITH INSERTION OF TUBE: ICD-10-CM

## 2023-11-20 DIAGNOSIS — H61.23 BILATERAL IMPACTED CERUMEN: ICD-10-CM

## 2023-11-20 DIAGNOSIS — H69.93 DYSFUNCTION OF BOTH EUSTACHIAN TUBES: ICD-10-CM

## 2023-11-20 DIAGNOSIS — H65.33 CHRONIC MUCOID OTITIS MEDIA OF BOTH EARS: ICD-10-CM

## 2023-11-20 DIAGNOSIS — H90.11 CONDUCTIVE HEARING LOSS OF RIGHT EAR WITH UNRESTRICTED HEARING OF LEFT EAR: Primary | ICD-10-CM

## 2023-11-20 DIAGNOSIS — Z96.22 S/P BILATERAL MYRINGOTOMY WITH TUBE PLACEMENT: Primary | ICD-10-CM

## 2023-11-20 DIAGNOSIS — H93.13 TINNITUS OF BOTH EARS: ICD-10-CM

## 2023-11-20 RX ORDER — FLUTICASONE PROPIONATE 50 MCG
2 SPRAY, SUSPENSION (ML) NASAL DAILY
Qty: 16 G | Refills: 11 | Status: SHIPPED | OUTPATIENT
Start: 2023-11-20

## 2023-11-20 RX ORDER — ESCITALOPRAM OXALATE 10 MG/1
TABLET ORAL
COMMUNITY
Start: 2023-11-17

## 2023-11-20 NOTE — PROGRESS NOTES
YOB: 2000  Location: [facility]  Location Address: [address] [place] [address], [facility] [address], [address] [place] [address]  Location Phone: [phone]    Chief Complaint   Patient presents with    Ear Problem       History of Present Illness  Lynette Faulkner is a 23 y.o. female.  Lynette Faulkner is here for follow up of ENT complaints. The patient is s/p bilateral myringotomy with pe tube placement in 2018   She denies episodes of ear pain or infection since last visit, but does complain of ear drainage from the right ear every other day   She is not currently using flonase and has not used ear drops in several months   Patient does complain of excessive cerumen build up as well     Procedure visit with Yaquelin Rodriguez AUD (2023)          Past Medical History:   Diagnosis Date    ADD (attention deficit disorder)     Chronic otitis media     COVID-19 vaccine series completed     Eustachian tube dysfunction     H/O dizziness     from ear problems    History of ear infections     Impacted cerumen     Otalgia     TMJ (dislocation of temporomandibular joint)        Past Surgical History:   Procedure Laterality Date    CHOLECYSTECTOMY      CYST REMOVAL      left shoulder    EXCISION LESION      left foot benign    MYRINGOTOMY W/ TUBES Bilateral 2018    Procedure: BILATERAL MYRINGOTOMY WITH INSERTION OF EAR TUBES;  Surgeon: Gama Nuno MD;  Location: [facility];  Service:     TONSILLECTOMY         Outpatient Medications Marked as Taking for the 23 encounter (Office Visit) with Keshia Alfaro APRN   Medication Sig Dispense Refill    atenolol (TENORMIN) 25 MG tablet       escitalopram (LEXAPRO) 10 MG tablet       ibuprofen (ADVIL,MOTRIN) 800 MG tablet Take 1 tablet by mouth Every 8 (Eight) Hours As Needed for Mild Pain. 30 tablet 0       Amoxicillin and Azithromycin    Family History   Problem Relation Age of Onset    No Known Problems Mother     No Known Problems Father        Social History      Socioeconomic History    Marital status: Single   Tobacco Use    Smoking status: Never    Smokeless tobacco: Never   Vaping Use    Vaping Use: Never used   Substance and Sexual Activity    Alcohol use: No    Drug use: Defer    Sexual activity: Defer       Review of Systems   Constitutional: Negative.    HENT:  Positive for ear discharge.        Vitals:    11/20/23 1308   BP: 101/71   Pulse: 108   Temp: 98.4 °F (36.9 °C)       Body mass index is 26.47 kg/m².    Objective     Physical Exam  Vitals reviewed.   Constitutional:       Appearance: Normal appearance. She is normal weight.   HENT:      Head: Normocephalic.      Right Ear: External ear normal.      Left Ear: Tympanic membrane, ear canal and external ear normal.      Ears:      Comments: Right pe tube in place        Nose: Nose normal.      Mouth/Throat:      Lips: Pink.      Mouth: Mucous membranes are moist.      Pharynx: Uvula midline.   Neurological:      Mental Status: She is alert.         Assessment & Plan   Diagnoses and all orders for this visit:    1. S/p bilateral myringotomy with tube placement (Primary)    2. Bilateral impacted cerumen    3. Chronic mucoid otitis media of both ears    Other orders  -     fluticasone (FLONASE) 50 MCG/ACT nasal spray; 2 sprays into the nostril(s) as directed by provider Daily.  Dispense: 16 g; Refill: 11      * Surgery not found *  No orders of the defined types were placed in this encounter.    No follow-ups on file.     Use nasal spray daily   Patient Instructions   For the best response, use your nasal sprays every day without skipping doses. It may take several weeks before the full effect is acheived.

## 2023-11-20 NOTE — PROGRESS NOTES
FOLLOW-UP AUDIOMETRIC EVALUATION      Name:  Lynette Faulkner  :  2000  Age:  23 y.o.  Date of Evaluation:  2023       History:  Reason for visit:  Ms. Faulkner is seen today at the request of KARIN Souza for a follow-up hearing evaluation. Patient has a history of bilateral eustachian tube dysfunction. She had bilateral myringotomy with tube placement on 2018. Previous audio was on 2018. She feels she still has trouble hearing, even though her hearing was normal the last time she had a hearing test. She reports she has the TV louder than she feels she should and she has trouble hearing people when they talk to her at times.      PE Tubes:  yes, both ears   Other otologic surgical history: no, both ears  Tinnitus:  yes, occasional ringing in both ears   Dizziness:  no  Noise Exposure: yes, guns (right-handed) with hearing protection  Aural Fullness:  yes, both ears  Otalgia: no, both ears  Family history of hearing loss: no  Other significant history:  none  Head trauma requiring hospital stay: no  Previous brain MRI: yes, about 5 years ago    EVALUATION:          RESULTS:    Otoscopic Evaluation:  Right: minimal cerumen, tympanic membrane visualized and PE tube visualized  Left: minimal cerumen, tympanic membrane visualized         Tympanometry (226 Hz):  Right: Could not seal  Left: Type A- normal    Pure Tone Audiometry (via inserts with good reliability):    Right: mile conductive hearing loss rising to normal  Left: hearing sensitivity is within normal limits         Speech Audiometry:   Right: Speech Reception Threshold (SRT) was obtained at 15 dBHL  Word Recognition scores-  100% (excellent/within normal limits, %)  Presented at 55dBHL with 35dB of masking in opposite ear  Using NU-6 List 4A, 25 words  Left: Speech Reception Threshold (SRT) was obtained at 10 dBHL  Word Recognition scores-  100% (excellent/within normal limits, %)   Presented at 50dBHL with 30dB of masking  in opposite ear  Using NU-6 List 4A, 25 words      IMPRESSIONS:  Tympanometry showed normal middle ear pressure and static compliance, for the left ear. Could not seal for the right ear, likely due to perforation or patent PE tube. Pure tone thresholds for the right ear show a mild conductive hearing loss rising to normal, suggesting abnormal outer/middle ear function and normal cochlear/retrocochlear function. Pure tone thresholds for the left ear show hearing sensitivity is within normal limits, suggesting normal outer/middle ear function and normal cochlear/retrocochlear function. Patient was counseled with regard to the findings.      Diagnosis:   1. Conductive hearing loss of right ear with unrestricted hearing of left ear    2. Tinnitus of both ears    3. S/P myringotomy with insertion of tube    4. Dysfunction of both eustachian tubes        RECOMMENDATIONS/PLAN:  Follow-up recommendations per KARIN Souza    Return for audiologic testing if you begin to changes in hearing or concerns arise  Use communication strategies such as looking at the speaker when they talk, have them get your attention before they speak, have them rephrase instead of repeat if you cannot understand them, limit background noise and be in the same room as the speaker  Use hearing protection around loud noises such as lawn equipment, power tools, and firearms  Avoid silence when possible. Sleep with white noise/fan, or listen to nature sounds     EDUCATION:  Discussed results and recommendations with patient. Questions were addressed and the patient was encouraged to contact our department should concerns arise.      Candido Argueta [facility]  Licensed Audiologist

## 2024-06-28 ENCOUNTER — OFFICE VISIT (OUTPATIENT)
Dept: OTOLARYNGOLOGY | Facility: CLINIC | Age: 24
End: 2024-06-28
Payer: COMMERCIAL

## 2024-06-28 VITALS
DIASTOLIC BLOOD PRESSURE: 92 MMHG | SYSTOLIC BLOOD PRESSURE: 140 MMHG | WEIGHT: 160 LBS | HEIGHT: 66 IN | BODY MASS INDEX: 25.71 KG/M2 | HEART RATE: 90 BPM | TEMPERATURE: 98.7 F

## 2024-06-28 DIAGNOSIS — H69.93 DYSFUNCTION OF BOTH EUSTACHIAN TUBES: Primary | ICD-10-CM

## 2024-06-28 DIAGNOSIS — Z96.22 S/P MYRINGOTOMY WITH INSERTION OF TUBE: ICD-10-CM

## 2024-06-28 DIAGNOSIS — H66.006 RECURRENT ACUTE SUPPURATIVE OTITIS MEDIA WITHOUT SPONTANEOUS RUPTURE OF TYMPANIC MEMBRANE OF BOTH SIDES: ICD-10-CM

## 2024-06-28 DIAGNOSIS — H61.21 IMPACTED CERUMEN OF RIGHT EAR: ICD-10-CM

## 2024-06-28 RX ORDER — BUPROPION HYDROCHLORIDE 75 MG/1
75 TABLET ORAL
COMMUNITY
Start: 2024-05-31

## 2024-06-28 NOTE — PROGRESS NOTES
YOB: 2000  Location: [facility]  Location Address: [address] [place] [address], [facility] [address], [address] [place] [address]  Location Phone: [phone]    Chief Complaint   Patient presents with    Ear Problem       History of Present Illness  Lynette Faulkner is a 23 y.o. female.  Lynette Faulkner is here for follow up of ENT complaints. The patient has a history of bilateral myringotomy tube placement in 2018   She presents today with mild right ear discomfort and feels as if she has a cerumen impaction          Past Medical History:   Diagnosis Date    ADD (attention deficit disorder)     Chronic otitis media     COVID-19 vaccine series completed     Eustachian tube dysfunction     H/O dizziness     from ear problems    History of ear infections     Impacted cerumen     Otalgia     TMJ (dislocation of temporomandibular joint)        Past Surgical History:   Procedure Laterality Date    CHOLECYSTECTOMY      CYST REMOVAL      left shoulder    EXCISION LESION      left foot benign    MYRINGOTOMY W/ TUBES Bilateral 2018    Procedure: BILATERAL MYRINGOTOMY WITH INSERTION OF EAR TUBES;  Surgeon: Gama Nuno MD;  Location: [facility] OR;  Service:     TONSILLECTOMY         Outpatient Medications Marked as Taking for the 24 encounter (Office Visit) with Keshia Alfaro APRN   Medication Sig Dispense Refill    atenolol (TENORMIN) 25 MG tablet       buPROPion (WELLBUTRIN) 75 MG tablet Take 1 tablet by mouth.      escitalopram (LEXAPRO) 10 MG tablet       fluticasone (FLONASE) 50 MCG/ACT nasal spray 2 sprays into the nostril(s) as directed by provider Daily. 16 g 11    ibuprofen (ADVIL,MOTRIN) 800 MG tablet Take 1 tablet by mouth Every 8 (Eight) Hours As Needed for Mild Pain. 30 tablet 0       Amoxicillin and Azithromycin    Family History   Problem Relation Age of Onset    No Known Problems Mother     No Known Problems Father        Social History     Socioeconomic History    Marital status: Single   Tobacco Use    Smoking  status: Never    Smokeless tobacco: Never   Vaping Use    Vaping status: Never Used   Substance and Sexual Activity    Alcohol use: No    Drug use: Defer    Sexual activity: Defer       Review of Systems   Constitutional: Negative.    HENT: Negative.         Vitals:    06/28/24 0942   BP: 140/92   Pulse: 90   Temp: 98.7 °F (37.1 °C)       Body mass index is 25.82 kg/m².    Objective     Physical Exam  Vitals reviewed.   Constitutional:       Appearance: Normal appearance. She is normal weight.   HENT:      Head: Normocephalic.      Right Ear: There is impacted cerumen.      Left Ear: Tympanic membrane, ear canal and external ear normal.      Nose: Nose normal.      Mouth/Throat:      Lips: Pink.   Neurological:      Mental Status: She is alert.       Ear Cerumen Removal    Date/Time: 6/28/2024 10:21 AM    Performed by: Keshia Alfaro APRN  Authorized by: Keshia Alfaro APRN  Consent: Verbal consent obtained.  Consent given by: patient  Patient identity confirmed: verbally with patient    Anesthesia:  Local Anesthetic: none  Location details: right ear  Patient tolerance: patient tolerated the procedure well with no immediate complications  Procedure type: instrumentation, curette         Assessment & Plan   Problems Addressed this Visit          ENT    Dysfunction of both eustachian tubes - Primary    Recurrent acute suppurative otitis media without spontaneous rupture of tympanic membrane of both sides     Other Visit Diagnoses       S/P myringotomy with insertion of tube        Impacted cerumen of right ear              Diagnoses         Codes Comments    Dysfunction of both eustachian tubes    -  Primary ICD-10-CM: H69.93  ICD-9-CM: 381.81     Recurrent acute suppurative otitis media without spontaneous rupture of tympanic membrane of both sides     ICD-10-CM: H66.006  ICD-9-CM: 382.00     S/P myringotomy with insertion of tube     ICD-10-CM: Z96.22  ICD-9-CM: V45.89     Impacted cerumen of right ear      ICD-10-CM: H61.21  ICD-9-CM: 380.4           * Surgery not found *  Orders Placed This Encounter   Procedures    Ear Cerumen Removal     This order was created via procedure documentation     Order Specific Question:   Release to patient     Answer:   Routine Release [[id number]]     No follow-ups on file.     Dry ear precautions   Call with new/worsening concerns    There are no Patient Instructions on file for this visit.

## 2024-08-26 ENCOUNTER — TELEPHONE (OUTPATIENT)
Dept: OTOLARYNGOLOGY | Facility: CLINIC | Age: 24
End: 2024-08-26
Payer: COMMERCIAL

## 2024-08-26 RX ORDER — CIPROFLOXACIN AND DEXAMETHASONE 3; 1 MG/ML; MG/ML
4 SUSPENSION/ DROPS AURICULAR (OTIC) 2 TIMES DAILY
Qty: 7.5 ML | Refills: 0 | Status: SHIPPED | OUTPATIENT
Start: 2024-08-26

## 2024-08-26 NOTE — TELEPHONE ENCOUNTER
Caller: AUGUSTUS GRIFFITH     Relationship: SELF    Best call back number: [phone]    What is your medical concern? INCOMING CALL FROM PT, PT SAID SHE WAS SWIMMING IN [place] AND SHE GOT A BUNCH OF SALT WATER IN HER RIGHT EAR, EAR HAS BEEN ACHING REALLY BAD INTO HER NECK. STARTED ON THURSDAY. PT SAID SHE WAS ABLE TO CALL OFFICE WHENEVER SHE GOT EAR INFECTIONS AND THEY WOULD CALL IN EAR DROPS FOR HER.    How long has this issue been going on? THURSDAY 8/22/24    Is your provider already aware of this issue? NO    Have you been treated for this issue? NO

## 2025-01-09 ENCOUNTER — OFFICE VISIT (OUTPATIENT)
Dept: OTOLARYNGOLOGY | Facility: CLINIC | Age: 25
End: 2025-01-09
Payer: COMMERCIAL

## 2025-01-09 VITALS — HEIGHT: 66 IN | BODY MASS INDEX: 25.71 KG/M2 | WEIGHT: 160 LBS

## 2025-01-09 DIAGNOSIS — H90.11 CONDUCTIVE HEARING LOSS OF RIGHT EAR WITH UNRESTRICTED HEARING OF LEFT EAR: ICD-10-CM

## 2025-01-09 DIAGNOSIS — Z96.22 S/P MYRINGOTOMY WITH INSERTION OF TUBE: Primary | ICD-10-CM

## 2025-01-09 DIAGNOSIS — H66.006 RECURRENT ACUTE SUPPURATIVE OTITIS MEDIA WITHOUT SPONTANEOUS RUPTURE OF TYMPANIC MEMBRANE OF BOTH SIDES: ICD-10-CM

## 2025-01-09 DIAGNOSIS — H61.21 IMPACTED CERUMEN OF RIGHT EAR: ICD-10-CM

## 2025-01-09 PROCEDURE — 99213 OFFICE O/P EST LOW 20 MIN: CPT | Performed by: NURSE PRACTITIONER

## 2025-01-09 RX ORDER — MUPIROCIN 20 MG/G
1 OINTMENT TOPICAL 2 TIMES DAILY
Qty: 28 G | Refills: 0 | Status: SHIPPED | OUTPATIENT
Start: 2025-01-09 | End: 2025-01-23

## 2025-01-09 RX ORDER — DEXTROAMPHETAMINE SACCHARATE, AMPHETAMINE ASPARTATE MONOHYDRATE, DEXTROAMPHETAMINE SULFATE AND AMPHETAMINE SULFATE 6.25; 6.25; 6.25; 6.25 MG/1; MG/1; MG/1; MG/1
CAPSULE, EXTENDED RELEASE ORAL
COMMUNITY

## 2025-01-13 PROCEDURE — 69210 REMOVE IMPACTED EAR WAX UNI: CPT | Performed by: NURSE PRACTITIONER

## 2025-01-13 NOTE — PROGRESS NOTES
YOB: 2000  Location: [facility]  Location Address: [address] [place] [address], [facility], [address] [place] [address]  Location Phone: [phone]    Chief Complaint   Patient presents with    Cerumen Impaction       History of Present Illness  Lynette Faulkner is a 24 y.o. female.  Lynette Faulkner is here for follow up of ENT complaints. The patient has a history of bilateral myringotomy tube placement in 2018   She has had ongoing problems with cerumen impaction   She denies episodes of otorrhea since last visit       Past Medical History:   Diagnosis Date    ADD (attention deficit disorder)     Chronic otitis media     COVID-19 vaccine series completed     Eustachian tube dysfunction     H/O dizziness     from ear problems    History of ear infections     Impacted cerumen     Otalgia     TMJ (dislocation of temporomandibular joint)        Past Surgical History:   Procedure Laterality Date    CHOLECYSTECTOMY      CYST REMOVAL      left shoulder    EXCISION LESION      left foot benign    MYRINGOTOMY W/ TUBES Bilateral 2018    Procedure: BILATERAL MYRINGOTOMY WITH INSERTION OF EAR TUBES;  Surgeon: Gama Nuno MD;  Location: [facility] OR;  Service:     TONSILLECTOMY         Outpatient Medications Marked as Taking for the 25 encounter (Office Visit) with Keshia Alfaro APRN   Medication Sig Dispense Refill    amphetamine-dextroamphetamine XR (ADDERALL XR) 25 MG 24 hr capsule TAKE ONE CAPSULE BY MOUTH ONCE EVERY MORNING      atenolol (TENORMIN) 25 MG tablet       buPROPion (WELLBUTRIN) 75 MG tablet Take 1 tablet by mouth.      ciprofloxacin-dexAMETHasone (CIPRODEX) 0.3-0.1 % otic suspension Administer 4 drops into ear(s) as directed by provider 2 (Two) Times a Day. 7.5 mL 0    escitalopram (LEXAPRO) 10 MG tablet       fluticasone (FLONASE) 50 MCG/ACT nasal spray 2 sprays into the nostril(s) as directed by provider Daily. 16 g 11    ibuprofen (ADVIL,MOTRIN) 800 MG tablet Take 1 tablet by mouth Every 8 (Eight)  Hours As Needed for Mild Pain. 30 tablet 0       Amoxicillin and Azithromycin    Family History   Problem Relation Age of Onset    No Known Problems Mother     No Known Problems Father        Social History     Socioeconomic History    Marital status: Single   Tobacco Use    Smoking status: Never    Smokeless tobacco: Never   Vaping Use    Vaping status: Never Used   Substance and Sexual Activity    Alcohol use: No    Drug use: Defer    Sexual activity: Defer       Review of Systems   Constitutional: Negative.    HENT: Negative.         There were no vitals filed for this visit.    Body mass index is 25.82 kg/m².    Objective     Physical Exam  Vitals reviewed.   Constitutional:       Appearance: Normal appearance. She is normal weight.   HENT:      Head: Normocephalic.      Right Ear: There is impacted cerumen.      Left Ear: Tympanic membrane, ear canal and external ear normal.      Nose: Nose normal.      Mouth/Throat:      Lips: Pink.   Neurological:      Mental Status: She is alert.       Ear Cerumen Removal    Date/Time: 1/13/2025 8:25 AM    Performed by: Keshia Alfaro APRN  Authorized by: Keshia Alfaro APRN  Consent: Verbal consent obtained.  Consent given by: patient  Location details: right ear  Patient tolerance: patient tolerated the procedure well with no immediate complications  Comments: Once cerumen removed pe tube dry and patent     Procedure type: instrumentation, curette           Assessment & Plan   Diagnoses and all orders for this visit:    1. S/P myringotomy with insertion of tube (Primary)    2. Impacted cerumen of right ear    3. Recurrent acute suppurative otitis media without spontaneous rupture of tympanic membrane of both sides    4. Conductive hearing loss of right ear with unrestricted hearing of left ear    Other orders  -     mupirocin (BACTROBAN) 2 % ointment; Apply 1 Application topically to the appropriate area as directed 2 (Two) Times a Day for 14 days.  Dispense: 28 g;  Refill: 0  -     Ear Cerumen Removal      * Surgery not found *  Orders Placed This Encounter   Procedures    Ear Cerumen Removal     This order was created via procedure documentation     Order Specific Question:   Release to patient     Answer:   Routine Release [[id number]]     Return in about 3 months (around 4/9/2025).     Dry ear precautions   Call for new/worsening concerns   Return in 3 months     Patient Instructions   WATER PRECAUTIONS FOR EARS    Protecting your ears from water may sometimes be necessary for the health of your ears.     > Ear plugs: You may use earplugs: over the counter silicone plugs or a cotton ball coated with vasoline when bathing. If conservative measures are not working, consider obtaining molded earplugs from the audiology department to use while bathing or swimming.   Purchase inexpensive types that are most comfortable for you. You can make your own by using cotton balls mixed with a generous amount of Vaseline petroleum jelly. Gently place these in the ear canal.    > Dry the ear canal: after getting out of the shower or bath, use a hair dryer on low heat blowing in the ear for 10-15 seconds. Pulling gently backwards on the ear straightens the ear canal and allows the air to get further down.    > If you are to use ear drops, please place them in the ear canal and give them a few seconds to run down.  Follow this by blowing in the ear canal with a hair dryer set on low heat for approximately 10 to 15 seconds.  You may do this multiple times during the day to help keep the ear canal dry.    >If you are swimming frequently- place a drop of oil in each ear canal prior to entering water. After you are finished in the water, place a drop of vinegar in each ear canal. Use a hair dryer on low heat to blow in each ear canal for 10-15 seconds to dry ears out.

## 2025-05-02 ENCOUNTER — PROCEDURE VISIT (OUTPATIENT)
Dept: OTOLARYNGOLOGY | Facility: CLINIC | Age: 25
End: 2025-05-02
Payer: COMMERCIAL

## 2025-05-02 ENCOUNTER — OFFICE VISIT (OUTPATIENT)
Dept: OTOLARYNGOLOGY | Facility: CLINIC | Age: 25
End: 2025-05-02
Payer: COMMERCIAL

## 2025-05-02 VITALS — HEIGHT: 66 IN | WEIGHT: 160 LBS | BODY MASS INDEX: 25.71 KG/M2

## 2025-05-02 DIAGNOSIS — Z01.10 HEARING WITHIN NORMAL LIMITS IN BOTH EARS: Primary | ICD-10-CM

## 2025-05-02 DIAGNOSIS — H61.21 IMPACTED CERUMEN OF RIGHT EAR: ICD-10-CM

## 2025-05-02 RX ORDER — DESVENLAFAXINE 50 MG/1
1 TABLET, FILM COATED, EXTENDED RELEASE ORAL DAILY
COMMUNITY
Start: 2025-04-17

## 2025-05-02 NOTE — PROGRESS NOTES
AUDIOMETRIC EVALUATION      Name:  Lynette Faulkner  :  2000  Age:  24 y.o.  Date of Evaluation:  2025       History:  Ms. Faulkner is seen today at the request of AZAEL Souza for a follow-up hearing evaluation. Patient has a history of bilateral conductive hearing loss. Previous audio was on 2023.    Audiologic Information:  PETs: Bilateral (BMT 2018)  Other otologic surgical history: No  Aural Pressure/Fullness: No  Otalgia: No  Otorrhea: No  Tinnitus: Occasional  Dizziness: No  Other significant history: No    **Case history obtained in office and through EMR system      EVALUATION:        RESULTS:    Otoscopic Evaluation:  Right: partially occluding cerumen, tympanic membrane visualized  Left: clear canal, tympanic membrane visualized    Tympanometry (226 Hz):  Right: Type A  Left: Type A    Pure Tone Audiometry:    Right: Normal hearing thresholds   Left: Normal hearing thresholds     Speech Audiometry:   Right: Speech Reception Threshold (SRT) was obtained at 10 dB HL  Word Recognition scores - Excellent (100)% at 50 dB, using NU-6 List 1A with 10 words  Left: Speech Reception Threshold (SRT) was obtained at 5 dB HL  Word Recognition scores - Excellent (100)% at 45 dB, using NU-6 List 1A with 10 words  SRT/PTA in good agreement bilaterally.    IMPRESSIONS:  Tympanometry showed normal middle ear pressure and static compliance, consistent with normal middle ear function for both ears. Pure tone thresholds for both ears show normal hearing, suggesting normal outer/middle ear function and normal cochlear/retrocochlear function. Patient was counseled with regard to the findings.      Diagnosis:  1. Hearing within normal limits in both ears         RECOMMENDATIONS/PLAN:  Follow-up recommendations per AZAEL Souza.  Practice good communication strategies to assist with everyday listening (eye contact with speakers, reduce background noise, encourage others to communicate  clearly and slowly).  Consistent utilization of hearing protection devices in noisy environments.  Repeat hearing evaluation if changes in hearing are noted or concerns arise.  Discussed results and recommendations with patient. Questions were addressed and the patient was encouraged to contact our department should concerns arise.        Lizz Nguyen, CCC-A, F-AAA  Doctor of Audiology

## 2025-05-02 NOTE — PROGRESS NOTES
YOB: 2000  Location: [facility]  Location Address: [address] [place] [address], [facility], [address] [place] [address]  Location Phone: [phone]    Chief Complaint   Patient presents with    Ear Problem     Follow up with no problems       History of Present Illness  Lynette Faulkner is a 24 y.o. female.  Lynette Faulkner is here for follow up of ENT complaints. The patient has a history of bilateral myringotomy with ear tube placement in 2018   Patient has had ongoing problems with cerumen impaction   She is using nasal sprays daily and denies episodes of otalgia or otorrhea since last visit           Procedure visit with Lizz Gonzalez AUD (2025)      Past Medical History:   Diagnosis Date    ADD (attention deficit disorder)     Chronic otitis media     COVID-19 vaccine series completed     Eustachian tube dysfunction     H/O dizziness     from ear problems    History of ear infections     Impacted cerumen     Otalgia     TMJ (dislocation of temporomandibular joint)        Past Surgical History:   Procedure Laterality Date    CHOLECYSTECTOMY      CYST REMOVAL      left shoulder    EXCISION LESION      left foot benign    MYRINGOTOMY W/ TUBES Bilateral 2018    Procedure: BILATERAL MYRINGOTOMY WITH INSERTION OF EAR TUBES;  Surgeon: Gama Nuno MD;  Location: [facility];  Service:     TONSILLECTOMY         Outpatient Medications Marked as Taking for the 25 encounter (Office Visit) with Keshia Alfaro APRN   Medication Sig Dispense Refill    amphetamine-dextroamphetamine XR (ADDERALL XR) 25 MG 24 hr capsule TAKE ONE CAPSULE BY MOUTH ONCE EVERY MORNING      atenolol (TENORMIN) 25 MG tablet       buPROPion (WELLBUTRIN) 75 MG tablet Take 1 tablet by mouth.      ciprofloxacin-dexAMETHasone (CIPRODEX) 0.3-0.1 % otic suspension Administer 4 drops into ear(s) as directed by provider 2 (Two) Times a Day. 7.5 mL 0    desvenlafaxine (PRISTIQ) 50 MG 24 hr tablet Take 1 tablet by mouth Daily.       escitalopram (LEXAPRO) 10 MG tablet       fluticasone (FLONASE) 50 MCG/ACT nasal spray 2 sprays into the nostril(s) as directed by provider Daily. 16 g 11    ibuprofen (ADVIL,MOTRIN) 800 MG tablet Take 1 tablet by mouth Every 8 (Eight) Hours As Needed for Mild Pain. 30 tablet 0       Amoxicillin and Azithromycin    Family History   Problem Relation Age of Onset    No Known Problems Mother     No Known Problems Father        Social History     Socioeconomic History    Marital status: Single   Tobacco Use    Smoking status: Never    Smokeless tobacco: Never   Vaping Use    Vaping status: Never Used   Substance and Sexual Activity    Alcohol use: No    Drug use: Defer    Sexual activity: Defer       Review of Systems   Constitutional: Negative.    HENT: Negative.         There were no vitals filed for this visit.    Body mass index is 25.82 kg/m².    Objective     Physical Exam  Vitals reviewed.   Constitutional:       Appearance: Normal appearance. She is normal weight.   HENT:      Head: Normocephalic.      Right Ear: External ear normal. There is impacted cerumen.      Left Ear: Tympanic membrane, ear canal and external ear normal.      Nose: Nose normal.      Mouth/Throat:      Lips: Pink.   Neurological:      Mental Status: She is alert.       Ear Cerumen Removal    Date/Time: 5/2/2025 9:26 AM    Performed by: Keshia Alfaro APRN  Authorized by: Keshia Alfaro APRN  Consent: Verbal consent obtained  Consent given by: patient  Patient understanding: patient states understanding of the procedure being performed  Patient identity confirmed: verbally with patient    Anesthesia:  Local Anesthetic: none  Location details: right ear  Patient tolerance: patient tolerated the procedure well with no immediate complications  Comments: Cerumen removed without difficulty pe tube extruded in cerumen     Procedure type: instrumentation, curette         Assessment & Plan   Diagnoses and all orders for this visit:    1. Hearing  within normal limits in both ears (Primary)    2. Impacted cerumen of right ear    Other orders  -     Ear Cerumen Removal      * Surgery not found *  Orders Placed This Encounter   Procedures    Ear Cerumen Removal     This order was created via procedure documentation     Release to patient:   Routine Release [[id number]]     Return in about 6 months (around 11/2/2025).     F/u in 6 months   Continue nasal sprays     There are no Patient Instructions on file for this visit.

## (undated) DEVICE — STERILE COTTON BALLS LARGE 5/P: Brand: MEDLINE

## (undated) DEVICE — TUBING, SUCTION, 1/4" X 12', STRAIGHT: Brand: MEDLINE

## (undated) DEVICE — GLV SURG BIOGEL M LTX PF 7 1/2

## (undated) DEVICE — INTEGRA® MICRO ENT BLADE,DOWNCUTTING BLADE, ANGLED SHAFT: Brand: INTEGRA®

## (undated) DEVICE — SURGICAL SUCTION CONNECTING TUBE WITH MALE CONNECTOR AND SUCTION CLAMP, 2 FT. LONG (.6 M), 5 MM I.D.: Brand: CONMED

## (undated) DEVICE — TOWEL,OR,DSP,ST,BLUE,STD,4/PK,20PK/CS: Brand: MEDLINE